# Patient Record
Sex: FEMALE | Race: WHITE
[De-identification: names, ages, dates, MRNs, and addresses within clinical notes are randomized per-mention and may not be internally consistent; named-entity substitution may affect disease eponyms.]

---

## 2018-02-22 ENCOUNTER — HOSPITAL ENCOUNTER (EMERGENCY)
Dept: HOSPITAL 91 - E/R | Age: 73
Discharge: HOME | End: 2018-02-22
Payer: MEDICARE

## 2018-02-22 ENCOUNTER — HOSPITAL ENCOUNTER (EMERGENCY)
Age: 73
Discharge: HOME | End: 2018-02-22

## 2018-02-22 DIAGNOSIS — D72.829: Primary | ICD-10-CM

## 2018-02-22 DIAGNOSIS — I10: ICD-10-CM

## 2018-02-22 DIAGNOSIS — R06.00: ICD-10-CM

## 2018-02-22 LAB
ADD MAN DIFF?: NO
ANION GAP: 14 (ref 8–16)
B-TYPE NATRIURETIC PEPTIDE: 228 PG/ML (ref 0–125)
BASOPHIL #: 0 10^3/UL (ref 0–0.1)
BASOPHILS %: 0.4 % (ref 0–2)
BLOOD UREA NITROGEN: 9 MG/DL (ref 7–20)
CALCIUM: 9.4 MG/DL (ref 8.4–10.2)
CARBON DIOXIDE: 27 MMOL/L (ref 21–31)
CHLORIDE: 101 MMOL/L (ref 97–110)
CREATININE: 0.6 MG/DL (ref 0.44–1)
EOSINOPHILS #: 0.1 10^3/UL (ref 0–0.5)
EOSINOPHILS %: 0.5 % (ref 0–7)
GLUCOSE: 109 MG/DL (ref 70–220)
HEMATOCRIT: 37.6 % (ref 37–47)
HEMOGLOBIN: 12.8 G/DL (ref 12–16)
LYMPHOCYTES #: 2.7 10^3/UL (ref 0.8–2.9)
LYMPHOCYTES %: 24 % (ref 15–51)
MEAN CORPUSCULAR HEMOGLOBIN: 30.4 PG (ref 29–33)
MEAN CORPUSCULAR HGB CONC: 34 G/DL (ref 32–37)
MEAN CORPUSCULAR VOLUME: 89.3 FL (ref 82–101)
MEAN PLATELET VOLUME: 10 FL (ref 7.4–10.4)
MONOCYTE #: 0.6 10^3/UL (ref 0.3–0.9)
MONOCYTES %: 4.8 % (ref 0–11)
NEUTROPHIL #: 8 10^3/UL (ref 1.6–7.5)
NEUTROPHILS %: 69.9 % (ref 39–77)
NUCLEATED RED BLOOD CELLS #: 0 10^3/UL (ref 0–0)
NUCLEATED RED BLOOD CELLS%: 0 /100WBC (ref 0–0)
PLATELET COUNT: 368 10^3/UL (ref 140–415)
POTASSIUM: 3.2 MMOL/L (ref 3.5–5.1)
RED BLOOD COUNT: 4.21 10^6/UL (ref 4.2–5.4)
RED CELL DISTRIBUTION WIDTH: 12.1 % (ref 11.5–14.5)
SODIUM: 139 MMOL/L (ref 135–144)
TROPONIN-I: 0.03 NG/ML (ref 0–0.12)
WHITE BLOOD COUNT: 11.4 10^3/UL (ref 4.8–10.8)

## 2018-02-22 PROCEDURE — 93005 ELECTROCARDIOGRAM TRACING: CPT

## 2018-02-22 PROCEDURE — 71045 X-RAY EXAM CHEST 1 VIEW: CPT

## 2018-02-22 PROCEDURE — 84484 ASSAY OF TROPONIN QUANT: CPT

## 2018-02-22 PROCEDURE — 85025 COMPLETE CBC W/AUTO DIFF WBC: CPT

## 2018-02-22 PROCEDURE — 96374 THER/PROPH/DIAG INJ IV PUSH: CPT

## 2018-02-22 PROCEDURE — 80048 BASIC METABOLIC PNL TOTAL CA: CPT

## 2018-02-22 PROCEDURE — 83880 ASSAY OF NATRIURETIC PEPTIDE: CPT

## 2018-02-22 PROCEDURE — 99285 EMERGENCY DEPT VISIT HI MDM: CPT

## 2018-02-22 RX ADMIN — ONDANSETRON HYDROCHLORIDE 1 MG: 2 INJECTION, SOLUTION INTRAMUSCULAR; INTRAVENOUS at 21:09

## 2018-02-22 RX ADMIN — LABETALOL HYDROCHLORIDE 1 MG: 5 INJECTION, SOLUTION INTRAVENOUS at 21:19

## 2018-02-22 RX ADMIN — ASPIRIN 325 MG ORAL TABLET 1 MG: 325 PILL ORAL at 21:09

## 2018-03-16 ENCOUNTER — HOSPITAL ENCOUNTER (EMERGENCY)
Dept: HOSPITAL 91 - E/R | Age: 73
Discharge: HOME | End: 2018-03-16
Payer: MEDICARE

## 2018-03-16 ENCOUNTER — HOSPITAL ENCOUNTER (EMERGENCY)
Age: 73
Discharge: HOME | End: 2018-03-16

## 2018-03-16 DIAGNOSIS — E66.9: ICD-10-CM

## 2018-03-16 DIAGNOSIS — Z79.84: ICD-10-CM

## 2018-03-16 DIAGNOSIS — E11.9: ICD-10-CM

## 2018-03-16 DIAGNOSIS — F43.0: Primary | ICD-10-CM

## 2018-03-16 DIAGNOSIS — I10: ICD-10-CM

## 2018-03-16 DIAGNOSIS — N39.0: ICD-10-CM

## 2018-03-16 LAB
ADD MAN DIFF?: NO
ADD UMIC: YES
ALANINE AMINOTRANSFERASE: 22 IU/L (ref 13–69)
ALBUMIN/GLOBULIN RATIO: 1.54
ALBUMIN: 5.1 G/DL (ref 3.3–4.9)
ALKALINE PHOSPHATASE: 86 IU/L (ref 42–121)
ANION GAP: 22 (ref 8–16)
ASPARTATE AMINO TRANSFERASE: 29 IU/L (ref 15–46)
BASOPHIL #: 0 10^3/UL (ref 0–0.1)
BASOPHILS %: 0.3 % (ref 0–2)
BILIRUBIN,DIRECT: 0 MG/DL (ref 0–0.2)
BILIRUBIN,TOTAL: 0.1 MG/DL (ref 0.2–1.3)
BLOOD UREA NITROGEN: 9 MG/DL (ref 7–20)
CALCIUM: 10.2 MG/DL (ref 8.4–10.2)
CARBON DIOXIDE: 22 MMOL/L (ref 21–31)
CHLORIDE: 102 MMOL/L (ref 97–110)
CREATININE: 0.82 MG/DL (ref 0.44–1)
EOSINOPHILS #: 0 10^3/UL (ref 0–0.5)
EOSINOPHILS %: 0.3 % (ref 0–7)
GLOBULIN: 3.3 G/DL (ref 1.3–3.2)
GLUCOSE: 115 MG/DL (ref 70–220)
HEMATOCRIT: 41.6 % (ref 37–47)
HEMOGLOBIN: 14.2 G/DL (ref 12–16)
LIPASE: 108 U/L (ref 23–300)
LYMPHOCYTES #: 2.3 10^3/UL (ref 0.8–2.9)
LYMPHOCYTES %: 21.7 % (ref 15–51)
MEAN CORPUSCULAR HEMOGLOBIN: 30 PG (ref 29–33)
MEAN CORPUSCULAR HGB CONC: 34.1 G/DL (ref 32–37)
MEAN CORPUSCULAR VOLUME: 87.8 FL (ref 82–101)
MEAN PLATELET VOLUME: 9.8 FL (ref 7.4–10.4)
MONOCYTE #: 0.4 10^3/UL (ref 0.3–0.9)
MONOCYTES %: 3.6 % (ref 0–11)
NEUTROPHIL #: 7.9 10^3/UL (ref 1.6–7.5)
NEUTROPHILS %: 73.7 % (ref 39–77)
NUCLEATED RED BLOOD CELLS #: 0 10^3/UL (ref 0–0)
NUCLEATED RED BLOOD CELLS%: 0 /100WBC (ref 0–0)
PLATELET COUNT: 357 10^3/UL (ref 140–415)
POTASSIUM: 3.7 MMOL/L (ref 3.5–5.1)
RED BLOOD COUNT: 4.74 10^6/UL (ref 4.2–5.4)
RED CELL DISTRIBUTION WIDTH: 12 % (ref 11.5–14.5)
SODIUM: 142 MMOL/L (ref 135–144)
TOTAL PROTEIN: 8.4 G/DL (ref 6.1–8.1)
TROPONIN-I: 0.01 NG/ML (ref 0–0.12)
UR ASCORBIC ACID: NEGATIVE MG/DL
UR BACTERIA: (no result) /HPF
UR BILIRUBIN (DIP): NEGATIVE MG/DL
UR BLOOD (DIP): (no result) MG/DL
UR CLARITY: (no result)
UR COLOR: YELLOW
UR GLUCOSE (DIP): NEGATIVE MG/DL
UR KETONES (DIP): NEGATIVE MG/DL
UR LEUKOCYTE ESTERASE (DIP): (no result) LEU/UL
UR MUCUS: (no result) /HPF
UR NITRITE (DIP): NEGATIVE MG/DL
UR PH (DIP): 5 (ref 5–9)
UR RBC: 4 /HPF (ref 0–5)
UR SPECIFIC GRAVITY (DIP): 1.02 (ref 1–1.03)
UR SQUAMOUS EPITHELIAL CELL: (no result) /HPF
UR TOTAL PROTEIN (DIP): (no result) MG/DL
UR UROBILINOGEN (DIP): NEGATIVE MG/DL
UR WBC: 3 /HPF (ref 0–5)
WHITE BLOOD COUNT: 10.8 10^3/UL (ref 4.8–10.8)

## 2018-03-16 PROCEDURE — 96374 THER/PROPH/DIAG INJ IV PUSH: CPT

## 2018-03-16 PROCEDURE — 85025 COMPLETE CBC W/AUTO DIFF WBC: CPT

## 2018-03-16 PROCEDURE — 99284 EMERGENCY DEPT VISIT MOD MDM: CPT

## 2018-03-16 PROCEDURE — 80053 COMPREHEN METABOLIC PANEL: CPT

## 2018-03-16 PROCEDURE — 83690 ASSAY OF LIPASE: CPT

## 2018-03-16 PROCEDURE — 81001 URINALYSIS AUTO W/SCOPE: CPT

## 2018-03-16 PROCEDURE — 93005 ELECTROCARDIOGRAM TRACING: CPT

## 2018-03-16 PROCEDURE — 36415 COLL VENOUS BLD VENIPUNCTURE: CPT

## 2018-03-16 PROCEDURE — 84484 ASSAY OF TROPONIN QUANT: CPT

## 2018-03-16 RX ADMIN — ONDANSETRON HYDROCHLORIDE 1 MG: 2 INJECTION, SOLUTION INTRAMUSCULAR; INTRAVENOUS at 13:45

## 2018-03-16 RX ADMIN — LIDOCAINE HYDROCHLORIDE 1 MLS/HR: 10 INJECTION, SOLUTION EPIDURAL; INFILTRATION; INTRACAUDAL; PERINEURAL at 13:48

## 2018-03-16 RX ADMIN — CEPHALEXIN 1 MG: 500 CAPSULE ORAL at 14:31

## 2018-03-16 RX ADMIN — LORAZEPAM 1 MG: 0.5 TABLET ORAL at 13:47

## 2018-08-08 ENCOUNTER — HOSPITAL ENCOUNTER (EMERGENCY)
Dept: HOSPITAL 91 - E/R | Age: 73
Discharge: HOME | End: 2018-08-08
Payer: MEDICARE

## 2018-08-08 ENCOUNTER — HOSPITAL ENCOUNTER (EMERGENCY)
Age: 73
Discharge: HOME | End: 2018-08-08

## 2018-08-08 DIAGNOSIS — I10: ICD-10-CM

## 2018-08-08 DIAGNOSIS — Z79.84: ICD-10-CM

## 2018-08-08 DIAGNOSIS — R40.2362: ICD-10-CM

## 2018-08-08 DIAGNOSIS — R40.2252: ICD-10-CM

## 2018-08-08 DIAGNOSIS — I16.0: Primary | ICD-10-CM

## 2018-08-08 DIAGNOSIS — R40.2142: ICD-10-CM

## 2018-08-08 LAB
ADD MAN DIFF?: NO
ADD UMIC: YES
ALANINE AMINOTRANSFERASE: 19 IU/L (ref 13–69)
ALBUMIN/GLOBULIN RATIO: 1.5
ALBUMIN: 4.5 G/DL (ref 3.3–4.9)
ALKALINE PHOSPHATASE: 50 IU/L (ref 42–121)
ANION GAP: 12 (ref 8–16)
ASPARTATE AMINO TRANSFERASE: 28 IU/L (ref 15–46)
BASOPHIL #: 0 10^3/UL (ref 0–0.1)
BASOPHILS %: 0.3 % (ref 0–2)
BILIRUBIN,DIRECT: 0 MG/DL (ref 0–0.2)
BILIRUBIN,TOTAL: 0.2 MG/DL (ref 0.2–1.3)
BLOOD UREA NITROGEN: 10 MG/DL (ref 7–20)
CALCIUM: 9.5 MG/DL (ref 8.4–10.2)
CARBON DIOXIDE: 24 MMOL/L (ref 21–31)
CHLORIDE: 109 MMOL/L (ref 97–110)
CREATININE: 0.56 MG/DL (ref 0.44–1)
EOSINOPHILS #: 0.1 10^3/UL (ref 0–0.5)
EOSINOPHILS %: 1.3 % (ref 0–7)
GLOBULIN: 3 G/DL (ref 1.3–3.2)
GLUCOSE: 98 MG/DL (ref 70–220)
HEMATOCRIT: 39.1 % (ref 37–47)
HEMOGLOBIN: 12.7 G/DL (ref 12–16)
LIPASE: 70 U/L (ref 23–300)
LYMPHOCYTES #: 3.2 10^3/UL (ref 0.8–2.9)
LYMPHOCYTES %: 34.3 % (ref 15–51)
MEAN CORPUSCULAR HEMOGLOBIN: 28.7 PG (ref 29–33)
MEAN CORPUSCULAR HGB CONC: 32.5 G/DL (ref 32–37)
MEAN CORPUSCULAR VOLUME: 88.5 FL (ref 82–101)
MEAN PLATELET VOLUME: 10.3 FL (ref 7.4–10.4)
MONOCYTE #: 0.4 10^3/UL (ref 0.3–0.9)
MONOCYTES %: 4.3 % (ref 0–11)
NEUTROPHIL #: 5.5 10^3/UL (ref 1.6–7.5)
NEUTROPHILS %: 59.6 % (ref 39–77)
NUCLEATED RED BLOOD CELLS #: 0 10^3/UL (ref 0–0)
NUCLEATED RED BLOOD CELLS%: 0 /100WBC (ref 0–0)
PLATELET COUNT: 302 10^3/UL (ref 140–415)
POTASSIUM: 3.9 MMOL/L (ref 3.5–5.1)
RED BLOOD COUNT: 4.42 10^6/UL (ref 4.2–5.4)
RED CELL DISTRIBUTION WIDTH: 13 % (ref 11.5–14.5)
SODIUM: 141 MMOL/L (ref 135–144)
TOTAL PROTEIN: 7.5 G/DL (ref 6.1–8.1)
UR ASCORBIC ACID: NEGATIVE MG/DL
UR BILIRUBIN (DIP): NEGATIVE MG/DL
UR BLOOD (DIP): NEGATIVE MG/DL
UR CLARITY: CLEAR
UR COLOR: (no result)
UR GLUCOSE (DIP): NEGATIVE MG/DL
UR KETONES (DIP): NEGATIVE MG/DL
UR LEUKOCYTE ESTERASE (DIP): (no result) LEU/UL
UR NITRITE (DIP): NEGATIVE MG/DL
UR PH (DIP): 7 (ref 5–9)
UR RBC: 0 /HPF (ref 0–5)
UR SPECIFIC GRAVITY (DIP): 1 (ref 1–1.03)
UR TOTAL PROTEIN (DIP): NEGATIVE MG/DL
UR UROBILINOGEN (DIP): NEGATIVE MG/DL
UR WBC: 2 /HPF (ref 0–5)
WHITE BLOOD COUNT: 9.2 10^3/UL (ref 4.8–10.8)

## 2018-08-08 PROCEDURE — 36415 COLL VENOUS BLD VENIPUNCTURE: CPT

## 2018-08-08 PROCEDURE — 80053 COMPREHEN METABOLIC PANEL: CPT

## 2018-08-08 PROCEDURE — 83690 ASSAY OF LIPASE: CPT

## 2018-08-08 PROCEDURE — 85025 COMPLETE CBC W/AUTO DIFF WBC: CPT

## 2018-08-08 PROCEDURE — 99285 EMERGENCY DEPT VISIT HI MDM: CPT

## 2018-08-08 PROCEDURE — 96375 TX/PRO/DX INJ NEW DRUG ADDON: CPT

## 2018-08-08 PROCEDURE — 96374 THER/PROPH/DIAG INJ IV PUSH: CPT

## 2018-08-08 PROCEDURE — 81001 URINALYSIS AUTO W/SCOPE: CPT

## 2018-08-08 PROCEDURE — 75635 CT ANGIO ABDOMINAL ARTERIES: CPT

## 2018-08-08 RX ADMIN — ONDANSETRON 1 MG: 4 TABLET, ORALLY DISINTEGRATING ORAL at 23:00

## 2018-08-08 RX ADMIN — LIDOCAINE HYDROCHLORIDE 1 MLS/HR: 10 INJECTION, SOLUTION EPIDURAL; INFILTRATION; INTRACAUDAL; PERINEURAL at 20:47

## 2018-08-08 RX ADMIN — HYDROCODONE BITARTRATE AND ACETAMINOPHEN 1 TAB: 10; 325 TABLET ORAL at 23:00

## 2018-08-08 RX ADMIN — ONDANSETRON HYDROCHLORIDE 1 MG: 2 INJECTION, SOLUTION INTRAMUSCULAR; INTRAVENOUS at 20:47

## 2018-08-08 RX ADMIN — NICARDIPINE HYDROCHLORIDE 1 MG: 30 CAPSULE ORAL at 22:54

## 2019-03-08 ENCOUNTER — HOSPITAL ENCOUNTER (EMERGENCY)
Dept: HOSPITAL 54 - ER | Age: 74
Discharge: HOME | End: 2019-03-08
Payer: MEDICARE

## 2019-03-08 VITALS — HEIGHT: 65 IN | BODY MASS INDEX: 28.66 KG/M2 | WEIGHT: 172 LBS

## 2019-03-08 VITALS — DIASTOLIC BLOOD PRESSURE: 86 MMHG | SYSTOLIC BLOOD PRESSURE: 141 MMHG

## 2019-03-08 DIAGNOSIS — R42: ICD-10-CM

## 2019-03-08 DIAGNOSIS — I10: ICD-10-CM

## 2019-03-08 DIAGNOSIS — E03.9: ICD-10-CM

## 2019-03-08 DIAGNOSIS — R11.2: Primary | ICD-10-CM

## 2019-03-08 DIAGNOSIS — R51: ICD-10-CM

## 2019-03-08 DIAGNOSIS — I48.91: ICD-10-CM

## 2019-03-08 DIAGNOSIS — K21.9: ICD-10-CM

## 2019-03-08 LAB
ALBUMIN SERPL BCP-MCNC: 3.6 G/DL (ref 3.4–5)
ALP SERPL-CCNC: 73 U/L (ref 46–116)
ALT SERPL W P-5'-P-CCNC: 18 U/L (ref 12–78)
AST SERPL W P-5'-P-CCNC: 16 U/L (ref 15–37)
BASOPHILS # BLD AUTO: 0 /CMM (ref 0–0.2)
BASOPHILS NFR BLD AUTO: 0.3 % (ref 0–2)
BILIRUB DIRECT SERPL-MCNC: 0.1 MG/DL (ref 0–0.2)
BILIRUB SERPL-MCNC: 0.2 MG/DL (ref 0.2–1)
BUN SERPL-MCNC: 14 MG/DL (ref 7–18)
CALCIUM SERPL-MCNC: 9.1 MG/DL (ref 8.5–10.1)
CHLORIDE SERPL-SCNC: 107 MMOL/L (ref 98–107)
CO2 SERPL-SCNC: 27 MMOL/L (ref 21–32)
CREAT SERPL-MCNC: 0.7 MG/DL (ref 0.6–1.3)
EOSINOPHIL NFR BLD AUTO: 1.2 % (ref 0–6)
GLUCOSE SERPL-MCNC: 114 MG/DL (ref 74–106)
HCT VFR BLD AUTO: 37 % (ref 33–45)
HGB BLD-MCNC: 12.1 G/DL (ref 11.5–14.8)
LIPASE SERPL-CCNC: 132 U/L (ref 73–393)
LYMPHOCYTES NFR BLD AUTO: 2.5 /CMM (ref 0.8–4.8)
LYMPHOCYTES NFR BLD AUTO: 29.4 % (ref 20–44)
MCHC RBC AUTO-ENTMCNC: 33 G/DL (ref 31–36)
MCV RBC AUTO: 90 FL (ref 82–100)
MONOCYTES NFR BLD AUTO: 0.4 /CMM (ref 0.1–1.3)
MONOCYTES NFR BLD AUTO: 4.5 % (ref 2–12)
NEUTROPHILS # BLD AUTO: 5.5 /CMM (ref 1.8–8.9)
NEUTROPHILS NFR BLD AUTO: 64.6 % (ref 43–81)
PLATELET # BLD AUTO: 319 /CMM (ref 150–450)
POTASSIUM SERPL-SCNC: 3.9 MMOL/L (ref 3.5–5.1)
PROT SERPL-MCNC: 6.9 G/DL (ref 6.4–8.2)
RBC # BLD AUTO: 4.08 MIL/UL (ref 4–5.2)
SODIUM SERPL-SCNC: 143 MMOL/L (ref 136–145)
WBC NRBC COR # BLD AUTO: 8.6 K/UL (ref 4.3–11)

## 2019-05-08 ENCOUNTER — HOSPITAL ENCOUNTER (OUTPATIENT)
Dept: HOSPITAL 10 - E/R | Age: 74
Setting detail: OBSERVATION
LOS: 2 days | Discharge: HOME | End: 2019-05-10
Attending: HOSPITALIST | Admitting: HOSPITALIST
Payer: MEDICARE

## 2019-05-08 ENCOUNTER — HOSPITAL ENCOUNTER (OUTPATIENT)
Dept: HOSPITAL 91 - E/R | Age: 74
Setting detail: OBSERVATION
LOS: 2 days | Discharge: HOME | End: 2019-05-10
Payer: MEDICARE

## 2019-05-08 VITALS — WEIGHT: 179.02 LBS | BODY MASS INDEX: 41.43 KG/M2 | HEIGHT: 55 IN

## 2019-05-08 VITALS — DIASTOLIC BLOOD PRESSURE: 95 MMHG | SYSTOLIC BLOOD PRESSURE: 177 MMHG | HEART RATE: 118 BPM

## 2019-05-08 VITALS — HEART RATE: 110 BPM | DIASTOLIC BLOOD PRESSURE: 75 MMHG | RESPIRATION RATE: 18 BRPM | SYSTOLIC BLOOD PRESSURE: 133 MMHG

## 2019-05-08 VITALS — HEART RATE: 82 BPM

## 2019-05-08 VITALS — DIASTOLIC BLOOD PRESSURE: 101 MMHG | HEART RATE: 76 BPM | SYSTOLIC BLOOD PRESSURE: 164 MMHG | RESPIRATION RATE: 18 BRPM

## 2019-05-08 DIAGNOSIS — I10: ICD-10-CM

## 2019-05-08 DIAGNOSIS — I48.91: ICD-10-CM

## 2019-05-08 DIAGNOSIS — Z79.82: ICD-10-CM

## 2019-05-08 DIAGNOSIS — K29.50: Primary | ICD-10-CM

## 2019-05-08 DIAGNOSIS — E03.9: ICD-10-CM

## 2019-05-08 DIAGNOSIS — K44.9: ICD-10-CM

## 2019-05-08 LAB
ADD MAN DIFF?: NO
ALANINE AMINOTRANSFERASE: 21 IU/L (ref 13–69)
ALBUMIN/GLOBULIN RATIO: 1.16
ALBUMIN: 4.2 G/DL (ref 3.3–4.9)
ALKALINE PHOSPHATASE: 86 IU/L (ref 42–121)
ANION GAP: 11 (ref 5–13)
ASPARTATE AMINO TRANSFERASE: 24 IU/L (ref 15–46)
BASOPHIL #: 0 10^3/UL (ref 0–0.1)
BASOPHILS %: 0.2 % (ref 0–2)
BILIRUBIN,DIRECT: 0 MG/DL (ref 0–0.2)
BILIRUBIN,TOTAL: 0.3 MG/DL (ref 0.2–1.3)
BLOOD UREA NITROGEN: 13 MG/DL (ref 7–20)
CALCIUM: 9.9 MG/DL (ref 8.4–10.2)
CARBON DIOXIDE: 22 MMOL/L (ref 21–31)
CHLORIDE: 110 MMOL/L (ref 97–110)
CREATININE: 0.71 MG/DL (ref 0.44–1)
EOSINOPHILS #: 0.1 10^3/UL (ref 0–0.5)
EOSINOPHILS %: 0.9 % (ref 0–7)
FREE T4 (FREE THYROXINE): 1.5 NG/DL (ref 0.78–2.44)
GLOBULIN: 3.6 G/DL (ref 1.3–3.2)
GLUCOSE: 116 MG/DL (ref 70–220)
HEMATOCRIT: 40.8 % (ref 37–47)
HEMOGLOBIN: 13.1 G/DL (ref 12–16)
IMMATURE GRANS #M: 0.03 10^3/UL (ref 0–0.03)
IMMATURE GRANS % (M): 0.4 % (ref 0–0.43)
INR: 0.85
LIPASE: 182 U/L (ref 23–300)
LYMPHOCYTES #: 2.2 10^3/UL (ref 0.8–2.9)
LYMPHOCYTES %: 26.9 % (ref 15–51)
MEAN CORPUSCULAR HEMOGLOBIN: 28.7 PG (ref 29–33)
MEAN CORPUSCULAR HGB CONC: 32.1 G/DL (ref 32–37)
MEAN CORPUSCULAR VOLUME: 89.5 FL (ref 82–101)
MEAN PLATELET VOLUME: 10.2 FL (ref 7.4–10.4)
MONOCYTE #: 0.4 10^3/UL (ref 0.3–0.9)
MONOCYTES %: 4.6 % (ref 0–11)
NEUTROPHIL #: 5.4 10^3/UL (ref 1.6–7.5)
NEUTROPHILS %: 67 % (ref 39–77)
NUCLEATED RED BLOOD CELLS #: 0 10^3/UL (ref 0–0)
NUCLEATED RED BLOOD CELLS%: 0 /100WBC (ref 0–0)
PARTIAL THROMBOPLASTIN TIME: 28.6 SEC (ref 23–35)
PLATELET COUNT: 355 10^3/UL (ref 140–415)
POTASSIUM: 5 MMOL/L (ref 3.5–5.1)
PROTIME: 11.7 SEC (ref 11.9–14.9)
PT RATIO: 0.9
RED BLOOD COUNT: 4.56 10^6/UL (ref 4.2–5.4)
RED CELL DISTRIBUTION WIDTH: 13.2 % (ref 11.5–14.5)
SODIUM: 143 MMOL/L (ref 135–144)
TOTAL PROTEIN: 7.8 G/DL (ref 6.1–8.1)
TROPONIN-I: < 0.012 NG/ML (ref 0–0.12)
WHITE BLOOD COUNT: 8 10^3/UL (ref 4.8–10.8)

## 2019-05-08 PROCEDURE — 43239 EGD BIOPSY SINGLE/MULTIPLE: CPT

## 2019-05-08 PROCEDURE — G0378 HOSPITAL OBSERVATION PER HR: HCPCS

## 2019-05-08 PROCEDURE — 85610 PROTHROMBIN TIME: CPT

## 2019-05-08 PROCEDURE — 92610 EVALUATE SWALLOWING FUNCTION: CPT

## 2019-05-08 PROCEDURE — 80053 COMPREHEN METABOLIC PANEL: CPT

## 2019-05-08 PROCEDURE — 83036 HEMOGLOBIN GLYCOSYLATED A1C: CPT

## 2019-05-08 PROCEDURE — 96376 TX/PRO/DX INJ SAME DRUG ADON: CPT

## 2019-05-08 PROCEDURE — C9113 INJ PANTOPRAZOLE SODIUM, VIA: HCPCS

## 2019-05-08 PROCEDURE — 85730 THROMBOPLASTIN TIME PARTIAL: CPT

## 2019-05-08 PROCEDURE — 99285 EMERGENCY DEPT VISIT HI MDM: CPT

## 2019-05-08 PROCEDURE — 96374 THER/PROPH/DIAG INJ IV PUSH: CPT

## 2019-05-08 PROCEDURE — 80061 LIPID PANEL: CPT

## 2019-05-08 PROCEDURE — 84100 ASSAY OF PHOSPHORUS: CPT

## 2019-05-08 PROCEDURE — 88312 SPECIAL STAINS GROUP 1: CPT

## 2019-05-08 PROCEDURE — 96375 TX/PRO/DX INJ NEW DRUG ADDON: CPT

## 2019-05-08 PROCEDURE — 97167 OT EVAL HIGH COMPLEX 60 MIN: CPT

## 2019-05-08 PROCEDURE — 74018 RADEX ABDOMEN 1 VIEW: CPT

## 2019-05-08 PROCEDURE — 84439 ASSAY OF FREE THYROXINE: CPT

## 2019-05-08 PROCEDURE — 93005 ELECTROCARDIOGRAM TRACING: CPT

## 2019-05-08 PROCEDURE — 71045 X-RAY EXAM CHEST 1 VIEW: CPT

## 2019-05-08 PROCEDURE — 84484 ASSAY OF TROPONIN QUANT: CPT

## 2019-05-08 PROCEDURE — 84443 ASSAY THYROID STIM HORMONE: CPT

## 2019-05-08 PROCEDURE — 88305 TISSUE EXAM BY PATHOLOGIST: CPT

## 2019-05-08 PROCEDURE — 85025 COMPLETE CBC W/AUTO DIFF WBC: CPT

## 2019-05-08 PROCEDURE — 97161 PT EVAL LOW COMPLEX 20 MIN: CPT

## 2019-05-08 PROCEDURE — 80048 BASIC METABOLIC PNL TOTAL CA: CPT

## 2019-05-08 PROCEDURE — 83690 ASSAY OF LIPASE: CPT

## 2019-05-08 PROCEDURE — 36415 COLL VENOUS BLD VENIPUNCTURE: CPT

## 2019-05-08 PROCEDURE — 83735 ASSAY OF MAGNESIUM: CPT

## 2019-05-08 RX ADMIN — ONDANSETRON HYDROCHLORIDE 1 MG: 2 INJECTION, SOLUTION INTRAMUSCULAR; INTRAVENOUS at 20:13

## 2019-05-08 RX ADMIN — PHENOBARBITAL, HYOSCYAMINE SULFATE, ATROPINE SULFATE, SCOPOLAMINE HYDROBROMIDE 1 TAB: 16.2; .1037; .0194; .0065 TABLET ORAL at 10:58

## 2019-05-08 RX ADMIN — ACETAMINOPHEN 1 MG: 325 TABLET, FILM COATED ORAL at 23:59

## 2019-05-08 RX ADMIN — CALCIUM GLUCONATE SCH MLS/HR: 94 INJECTION, SOLUTION INTRAVENOUS at 20:13

## 2019-05-08 RX ADMIN — ATORVASTATIN CALCIUM SCH MG: 80 TABLET, FILM COATED ORAL at 20:24

## 2019-05-08 RX ADMIN — FAMOTIDINE 1 MG: 20 TABLET ORAL at 10:58

## 2019-05-08 RX ADMIN — PANTOPRAZOLE SODIUM SCH MG: 40 INJECTION, POWDER, FOR SOLUTION INTRAVENOUS at 17:15

## 2019-05-08 RX ADMIN — THIAMINE HYDROCHLORIDE 1 MLS/HR: 100 INJECTION, SOLUTION INTRAMUSCULAR; INTRAVENOUS at 10:56

## 2019-05-08 RX ADMIN — POTASSIUM ACETATE 1 MLS/HR: 3.93 INJECTION, SOLUTION, CONCENTRATE INTRAVENOUS at 20:13

## 2019-05-08 RX ADMIN — CALCIUM GLUCONATE SCH MLS/HR: 94 INJECTION, SOLUTION INTRAVENOUS at 15:30

## 2019-05-08 RX ADMIN — POTASSIUM ACETATE 1 MLS/HR: 3.93 INJECTION, SOLUTION, CONCENTRATE INTRAVENOUS at 15:30

## 2019-05-08 RX ADMIN — PANTOPRAZOLE SODIUM 1 MG: 40 INJECTION, POWDER, FOR SOLUTION INTRAVENOUS at 17:15

## 2019-05-08 RX ADMIN — ALUMINUM HYDROXIDE, MAGNESIUM HYDROXIDE, DIMETHICONE 1 ML: 200; 200; 20 SUSPENSION ORAL at 10:58

## 2019-05-08 RX ADMIN — KETOROLAC TROMETHAMINE 1 MG: 15 INJECTION, SOLUTION INTRAMUSCULAR; INTRAVENOUS at 10:58

## 2019-05-08 RX ADMIN — ONDANSETRON HYDROCHLORIDE 1 MG: 2 INJECTION, SOLUTION INTRAMUSCULAR; INTRAVENOUS at 10:58

## 2019-05-08 RX ADMIN — TRIAMCINOLONE ACETONIDE SCH APPLIC: 1 CREAM TOPICAL at 21:00

## 2019-05-08 RX ADMIN — DEXAMETHASONE SODIUM PHOSPHATE PRN MG: 10 INJECTION, SOLUTION INTRAMUSCULAR; INTRAVENOUS at 20:13

## 2019-05-08 RX ADMIN — LORAZEPAM 1 MG: 2 INJECTION, SOLUTION INTRAMUSCULAR; INTRAVENOUS at 10:58

## 2019-05-08 RX ADMIN — CLOTRIMAZOLE AND BETAMETHASONE DIPROPIONATE SCH APPLIC: 10; .5 CREAM TOPICAL at 21:00

## 2019-05-08 RX ADMIN — METOPROLOL TARTRATE 1 MG: 100 TABLET ORAL at 20:24

## 2019-05-08 RX ADMIN — ONDANSETRON HYDROCHLORIDE 1 MG: 2 INJECTION, SOLUTION INTRAMUSCULAR; INTRAVENOUS at 13:08

## 2019-05-08 RX ADMIN — METOPROLOL TARTRATE SCH MG: 100 TABLET, FILM COATED ORAL at 20:24

## 2019-05-08 RX ADMIN — CLOTRIMAZOLE AND BETAMETHASONE DIPROPIONATE 1 APPLIC: 10; .5 CREAM TOPICAL at 21:00

## 2019-05-08 RX ADMIN — TRIAMCINOLONE ACETONIDE 1 APPLIC: 1 CREAM TOPICAL at 21:00

## 2019-05-08 RX ADMIN — ATORVASTATIN CALCIUM 1 MG: 80 TABLET, FILM COATED ORAL at 20:24

## 2019-05-08 RX ADMIN — PANTOPRAZOLE SODIUM 1 MG: 40 INJECTION, POWDER, FOR SOLUTION INTRAVENOUS at 10:59

## 2019-05-08 NOTE — CONS
Assessment/Plan


Assessment/Plan


Hospital Course (Demo Recall)


Assessment:


Epigastric pain


   -R/o gastritis vs PUD vs other


Nausea- without vomiting


Bloating/gas


HTN





Assessment:


PPI BID


Simethicone PRN 


Clear liquid diet


Npo after 5/9/19 0600


EGD tomorrow


   -Risk/benefits of sedation and procedure have been reviewed and patient 


agrees to move forward with procedure tomorrow


Patient seen in collaboration with Dr. Ny


CC:   GLORIA NY MD ;





Consultation Date/Type/Reason


Admit Date/Time





Date of Consultation:  May 8, 2019


Type of Consult


GI


Reason for Consultation


Epigastric pain, nausea


Date/Time of Note


DATE: 5/8/19 


TIME: 13:34





Hx of Present Illness


This is a 73-year-old [place] speaking female  was used, with past 


medical history of hypertension presented to the ED with complaints of 


significant epigastric pain x2 days nausea x2 months as well as belching and 


bloating.  She denies overt signs of GI bleed including melena, hematochezia, or


hematemesis.  Her last upper endoscopy was completed in 2010 showing some 


nodularity in the stomach as well as gastritis, colonoscopy was completed at 


that time positive for polyps.  Pathology was not available to review.  Time 


evaluation patient complained of significant epigastric pain with mild palpation


she is noted to have elevated heart rate and blood pressure at this time.  Her 


members at bedside who states patient does take Motrin or Advil however patient 


denies this.  Discussed plan for upper endoscopy reviewed risk/benefits of both 


procedure and sedation understanding was verbalized and patient agreed to move 


forward with procedure.


Review of Systems: 


A 12 system, review was conducted and is negative except as noted in the HPI or 


here.





Past Medical History


Home Meds


Active Scripts


Ondansetron (Ondansetron Odt) 4 Mg Tab.rapdis, 4 MG PO Q6H PRN for NAUSEA AND/OR


VOMITING, #10 TAB


   Prov:ANNA RAYMUNDO MD         8/8/18


Reported Medications


Acetaminophen* (Acetaminophen*) 500 MG Extra Strength Tablet, 500 MG PO Q4H PRN 


for PAIN AND OR ELEVATED TEMP, TAB


   5/8/19


Nitroglycerin* (Nitrostat*) 0.4 Mg Tab.subl, 0.4 MG SL Q5MIN PRN for CHEST PAIN,


BOTTLE


   5/8/19


Hydralazine Hcl* (Hydralazine Hcl*) 100 Mg Tablet, 1 TAB ORAL TID


   5/8/19


Diltiazem Hcl (DILTIAZEM 24HR CD) 180 Mg Cap.er.24h, 1 CAP ORAL DAILY


   5/8/19


Metoprolol Tartrate (Metoprolol Tartrate) 100 Mg Tablet, 1 TAB ORAL BID


   5/8/19


Aspirin (Aspirin) 81 Mg Chew, 1 TAB ORAL DAILY


   5/8/19


Triamcinolone Acetonide* (Kenalog*) 0.1%-15GM Cr, 1 APPLIC TOP TID, #1 TUB


   2/22/18


Donepezil* (Donepezil*) 10 Mg Tablet, 10 MG PO DAILY, #30 TAB


   2/22/18


Linaclotide (LINZESS) 145 Mcg Capsule, 145 MCG PO DAILY, #30 CAP


   2/22/18


Rosuvastatin Calcium* (Crestor*) 20 Mg Tablet, 20 MG PO QHS, #30 TAB


   2/22/18


Esomeprazole Mag Trihydrate (Nexium) 40 Mg Capsule.dr, 40 MG PO DAILY, #30 CAP


   2/22/18


Mirtazapine* (Mirtazapine*) 30 Mg Tablet, 30 MG PO DAILY, TAB


   2/22/18


Losartan-Hydrochlorothiazide (Losartan-HCTZ) 100-25 Mg Tab, 1 TAB PO DAILY, TAB


   2/22/18


Zolpidem Tartrate* (Zolpidem Tartrate*) 5 Mg Tablet, 5 MG PO QHS PRN for INSOM


YING, #30 TAB


   2/22/18


Amlodipine Besylate* (Norvasc*) 5 Mg Tablet, 5 MG PO DAILY, TAB


   2/22/18


Verapamil Hcl* (Verapamil ER*) 120 Mg Cap24h.pel, 120 MG PO DAILY, CAP


   2/22/18


Clotrimazole-Betamethasone Diprop (Clotrimazole-Betamethasone Diprop) 15 Gm 


Cream.gm., 1 APPLIC TOP BID, TUB


   2/22/18


Clonidine Patch (CLONIDINE PATCH) 0.3 Mg/24 Hr Patch, 1 PATCH.WK TD Q7D, #4 


PATCH.WK


   ON TUESDAY 2/22/18


Levothyroxine Sodium* (Levothyroxine Sodium*) 25 Mcg Tablet, 25 MCG PO BEFORE 


BREAKFAST, #30 TAB


   2/22/18


Meclizine Hcl* (Meclizine Hcl*) 25 Mg Tablet, 25 MG PO BID PRN for DIZZINESS, 


TAB


   2/22/18


Clonidine Hcl* (Clonidine Hcl*) 0.3 Mg Tablet, 0.3 MG PO TID PRN for HTN, TAB


   2/22/18


Discontinued Reported Medications


Tramadol Hcl* (Ultram*) 50 Mg Tablet, 50 MG PO BID PRN for PAIN, TAB


   2/22/18


Metformin Hcl* (Metformin Hcl*) 500 Mg Tablet, 500 MG PO WITH BREAKFAST DINNE, 


#60 TAB


   2/22/18


Naproxen* (Naproxen*) 500 Mg Tablet, 500 MG PO DAILY, TAB


   2/22/18


Hydrochlorothiazide* (Hydrochlorothiazide*) 25 Mg Tab, 25 MG PO DAILY, #30 TAB


   2/22/18


Acetaminophen with Codeine (Acetaminophen-Cod #3 Tablet) 1 Each Tablet, 1 TAB PO


DAILY PRN for AS NEEDED, #20 TAB


   2/22/18


Discontinued Scripts


Hydrocodone/Acetaminophen (Norco  Tablet) 1 Each Tablet, 1 TAB PO Q6H PRN 


for PAIN, #7 TAB


   Prov:ANNA RAYMUNDO MD         8/8/18


Lorazepam* (Ativan*) 0.5 Mg Tablet, 0.5 MG PO TID PRN for ANXIETY, #12 TAB


   Prov:KAYLA STATON MD         3/16/18


Cephalexin* (Keflex*) 500 Mg Capsule, 500 MG PO QID for 5 Days, CAP


   Prov:KAYLA STATON MD         3/16/18


Allergies:  


Coded Allergies:  


     No Known Allergies (Verified  Allergy, Unknown, 5/8/19)





Social History


Smoking Status:  Never smoker





Exam/Review of Systems


Exam


Vitals





Vital Signs


  Date      Temp  Pulse  Resp  B/P (MAP)   Pulse Ox  O2          O2 Flow    FiO2


Time                                                 Delivery    Rate


    5/8/19           85    18      122/72       100  Room Air


     12:14                           (89)


    5/8/19  98.4


     10:15





Exam


PHYSICAL EXAMINATION:


GENERAL: Alert & oriented x 3, in no acute distress


SKIN: No lesions


HEAD: Normocephalic, atraumatic, no tenderness.


EYES: Pupils equal reactive to light and accommodation, no discharge.


EARS/NOSE AND THROAT: Ears normal, nose normal.


NECK: Supple, no masses, thyroid normal.


CHEST: Inspection within normal limits.


CARDIOVASCULAR: Heart: Regular rate and rhythm


RESPIRATORY: Lungs clear to auscultation.


GASTROINTESTINAL AND LIVER: Abdomen: Soft, epigastric pain worse with palpation,


non-distended, no hernias, no masses, no organomegaly, no ascites, no guarding, 


no rebound tenderness, normoactive bowel sounds. Rectal: Deferred.





Results


Result Diagram:  


5/8/19 1108                                                                     


          5/8/19 1108





Results 24hrs





Laboratory Tests


               Test
                                5/8/19
11:08


               White Blood Count                           8.0


               Red Blood Count                            4.56


               Hemoglobin                                 13.1


               Hematocrit                                 40.8


               Mean Corpuscular Volume                    89.5


               Mean Corpuscular Hemoglobin               28.7  L


               Mean Corpuscular Hemoglobin
Concent       32.1  



               Red Cell Distribution Width                13.2


               Platelet Count                              355


               Mean Platelet Volume                       10.2


               Immature Granulocytes %                   0.400


               Neutrophils %                              67.0


               Lymphocytes %                              26.9


               Monocytes %                                 4.6


               Eosinophils %                               0.9


               Basophils %                                 0.2


               Nucleated Red Blood Cells %                 0.0


               Immature Granulocytes #                   0.030


               Neutrophils #                               5.4


               Lymphocytes #                               2.2


               Monocytes #                                 0.4


               Eosinophils #                               0.1


               Basophils #                                 0.0


               Nucleated Red Blood Cells #                 0.0


               Prothrombin Time                          11.7  L


               Prothrombin Time Ratio                      0.9


               INR International Normalized
Ratio        0.85  



               Activated Partial
Thromboplast Time       28.6  



               Sodium Level                                143


               Potassium Level                             5.0


               Chloride Level                              110


               Carbon Dioxide Level                         22


               Anion Gap                                    11


               Blood Urea Nitrogen                          13


               Creatinine                                 0.71


               Est Glomerular Filtrat Rate
mL/min     



               Glucose Level                               116


               Calcium Level                               9.9


               Total Bilirubin                             0.3


               Direct Bilirubin                           0.00


               Indirect Bilirubin                          0.3


               Aspartate Amino Transf
(AST/SGOT)           24  



               Alanine Aminotransferase
(ALT/SGPT)         21  



               Alkaline Phosphatase                         86


               Troponin I                           < 0.012


               Total Protein                               7.8


               Albumin                                     4.2


               Globulin                                  3.60  H


               Albumin/Globulin Ratio                     1.16


               Lipase                                      182














DENI CAIN              May 8, 2019 13:46

## 2019-05-08 NOTE — ERD
ER Documentation


Chief Complaint


Chief Complaint





AP WITH NAUSEA TODAY





HPI


73-year-old woman complaining of sharp nonexertional nonradiating epigastric 


abdominal pain, burning throat pain, belching, bloating, upper abdominal 


distention x2 days.  She has had severe nausea has been eating less due to 


nausea but denies vomiting.  She denies blood per rectum or melena, no chest 


pain or shortness of breath, no headache or blurry vision.  Patient does have a 


history of gastritis but ran out of her gastritis medications a few months ago, 


upper endoscopy performed about 10 years ago that she states revealed gastritis.





ROS


All systems reviewed and are negative except as per history of present illness.





Medications


Home Meds


Active Scripts


Ondansetron (Ondansetron Odt) 4 Mg Tab.rapdis, 4 MG PO Q6H PRN for NAUSEA AND/OR


VOMITING, #10 TAB


   Prov:ANNA RAYMUNDO MD         8/8/18


Reported Medications


Acetaminophen* (Acetaminophen*) 500 MG Extra Strength Tablet, 500 MG PO Q4H PRN 


for PAIN AND OR ELEVATED TEMP, TAB


   5/8/19


Nitroglycerin* (Nitrostat*) 0.4 Mg Tab.subl, 0.4 MG SL Q5MIN PRN for CHEST PAIN,


BOTTLE


   5/8/19


Hydralazine Hcl* (Hydralazine Hcl*) 100 Mg Tablet, 1 TAB ORAL TID


   5/8/19


Diltiazem Hcl (DILTIAZEM 24HR CD) 180 Mg Cap.er.24h, 1 CAP ORAL DAILY


   5/8/19


Metoprolol Tartrate (Metoprolol Tartrate) 100 Mg Tablet, 1 TAB ORAL BID


   5/8/19


Aspirin (Aspirin) 81 Mg Chew, 1 TAB ORAL DAILY


   5/8/19


Triamcinolone Acetonide* (Kenalog*) 0.1%-15GM Cr, 1 APPLIC TOP TID, #1 TUB


   2/22/18


Donepezil* (Donepezil*) 10 Mg Tablet, 10 MG PO DAILY, #30 TAB


   2/22/18


Linaclotide (LINZESS) 145 Mcg Capsule, 145 MCG PO DAILY, #30 CAP


   2/22/18


Rosuvastatin Calcium* (Crestor*) 20 Mg Tablet, 20 MG PO QHS, #30 TAB


   2/22/18


Esomeprazole Mag Trihydrate (Nexium) 40 Mg Capsule.dr, 40 MG PO DAILY, #30 CAP


   2/22/18


Mirtazapine* (Mirtazapine*) 30 Mg Tablet, 30 MG PO DAILY, TAB


   2/22/18


Losartan-Hydrochlorothiazide (Losartan-HCTZ) 100-25 Mg Tab, 1 TAB PO DAILY, TAB


   2/22/18


Zolpidem Tartrate* (Zolpidem Tartrate*) 5 Mg Tablet, 5 MG PO QHS PRN for 


INSOMNIA, #30 TAB


   2/22/18


Amlodipine Besylate* (Norvasc*) 5 Mg Tablet, 5 MG PO DAILY, TAB


   2/22/18


Verapamil Hcl* (Verapamil ER*) 120 Mg Cap24h.pel, 120 MG PO DAILY, CAP


   2/22/18


Clotrimazole-Betamethasone Diprop (Clotrimazole-Betamethasone Diprop) 15 Gm 


Cream.gm., 1 APPLIC TOP BID, TUB


   2/22/18


Clonidine Patch (CLONIDINE PATCH) 0.3 Mg/24 Hr Patch, 1 PATCH.WK TD Q7D, #4 


PATCH.WK


   ON TUESDAY 2/22/18


Levothyroxine Sodium* (Levothyroxine Sodium*) 25 Mcg Tablet, 25 MCG PO BEFORE 


BREAKFAST, #30 TAB


   2/22/18


Meclizine Hcl* (Meclizine Hcl*) 25 Mg Tablet, 25 MG PO BID PRN for DIZZINESS, 


TAB


   2/22/18


Clonidine Hcl* (Clonidine Hcl*) 0.3 Mg Tablet, 0.3 MG PO TID PRN for HTN, TAB


   2/22/18


Discontinued Reported Medications


Tramadol Hcl* (Ultram*) 50 Mg Tablet, 50 MG PO BID PRN for PAIN, TAB


   2/22/18


Metformin Hcl* (Metformin Hcl*) 500 Mg Tablet, 500 MG PO WITH BREAKFAST DINNE, 


#60 TAB


   2/22/18


Naproxen* (Naproxen*) 500 Mg Tablet, 500 MG PO DAILY, TAB


   2/22/18


Hydrochlorothiazide* (Hydrochlorothiazide*) 25 Mg Tab, 25 MG PO DAILY, #30 TAB


   2/22/18


Acetaminophen with Codeine (Acetaminophen-Cod #3 Tablet) 1 Each Tablet, 1 TAB PO


DAILY PRN for AS NEEDED, #20 TAB


   2/22/18


Discontinued Scripts


Hydrocodone/Acetaminophen (Norco  Tablet) 1 Each Tablet, 1 TAB PO Q6H PRN 


for PAIN, #7 TAB


   Prov:ANNA RAYMUNDO MD         8/8/18


Lorazepam* (Ativan*) 0.5 Mg Tablet, 0.5 MG PO TID PRN for ANXIETY, #12 TAB


   Prov:KAYLA STATON MD         3/16/18


Cephalexin* (Keflex*) 500 Mg Capsule, 500 MG PO QID for 5 Days, CAP


   Prov:KAYLA STATON MD         3/16/18





Allergies


Allergies:  


Coded Allergies:  


     No Known Allergies (Verified  Allergy, Unknown, 5/8/19)





PMhx/Soc


History of gastritis status post upper endoscopy about 10 years ago, 


hypertension, anxiety, hypothyroidism, atrial fibrillation


History of Surgery:  No


Anesthesia Reaction:  No


Hx Neurological Disorder:  No


Hx Respiratory Disorders:  No


Hx Cardiac Disorders:  Yes (HTN)


Hx Psychiatric Problems:  No


Hx Miscellaneous Medical Probl:  Yes (HTN)


Hx Alcohol Use:  No


Hx Substance Use:  No


Hx Tobacco Use:  No


Smoking Status:  Never smoker





FmHx


Family History:  No diabetes





Physical Exam


Vitals





Vital Signs


  Date      Temp  Pulse  Resp  B/P (MAP)   Pulse Ox  O2          O2 Flow    FiO2


Time                                                 Delivery    Rate


    5/8/19           85    18      122/72       100  Room Air


     12:14                           (89)


    5/8/19  98.4     86    18     177/105        98


     10:15                          (129)





Physical Exam


GENERAL: Well-developed, well-nourished, appears dehydrated, afebrile, anxious


HEENT: Dry mucous membranes, pink conjunctiva, no cervical spine tenderness or 


step-off deformities, no goiter, no jaundice or icterus, extraocular movements 


intact without pain. No submandibular induration, and no pharyngeal erythema


NEURO: Alert and oriented 3, cranial nerves II through XII intact bilaterally, 


pupils equal round reactive to light, no focal deficits or facial asymmetry, 


sensation intact distally Strength 5/5 in upper and lower extremities 


bilaterally


CARDIAC: Regular rate and rhythm, no murmurs rubs or gallops


LUNGS: Clear bilaterally no wheezing crackles or stridor


ABDOMEN: Soft nontender, no guarding, no rigidity, no rebound, no psoas sign no 


obturator sign. 


SKIN: Warm and dry to touch, no abrasions, contusions, or hematomas, no 


lacerations, no ecchymosis, no target lesions, and without ulcers


EXTREMITIES: No clubbing cyanosis or edema, calves are bilaterally symmetrical, 


no Homans sign, no popliteal cord sign. Distal pulses equal and bilateral


PSYCH: Acutely anxious


Result Diagram:  


5/8/19 1108                                                                     


          5/8/19 1108





Results 24 hrs





Laboratory Tests


              Test
                                  5/8/19
11:08


              White Blood Count                      8.0 10^3/ul


              Red Blood Count                       4.56 10^6/ul


              Hemoglobin                               13.1 g/dl


              Hematocrit                                  40.8 %


              Mean Corpuscular Volume                    89.5 fl


              Mean Corpuscular Hemoglobin                28.7 pg


              Mean Corpuscular Hemoglobin
Concent     32.1 g/dl 



              Red Cell Distribution Width                 13.2 %


              Platelet Count                         355 10^3/UL


              Mean Platelet Volume                       10.2 fl


              Immature Granulocytes %                    0.400 %


              Neutrophils %                               67.0 %


              Lymphocytes %                               26.9 %


              Monocytes %                                  4.6 %


              Eosinophils %                                0.9 %


              Basophils %                                  0.2 %


              Nucleated Red Blood Cells %            0.0 /100WBC


              Immature Granulocytes #              0.030 10^3/ul


              Neutrophils #                          5.4 10^3/ul


              Lymphocytes #                          2.2 10^3/ul


              Monocytes #                            0.4 10^3/ul


              Eosinophils #                          0.1 10^3/ul


              Basophils #                            0.0 10^3/ul


              Nucleated Red Blood Cells #            0.0 10^3/ul


              Prothrombin Time                          11.7 Sec


              Prothrombin Time Ratio                         0.9


              INR International Normalized
Ratio           0.85 



              Activated Partial
Thromboplast Time      28.6 Sec 



              Sodium Level                            143 mmol/L


              Potassium Level                         5.0 mmol/L


              Chloride Level                          110 mmol/L


              Carbon Dioxide Level                     22 mmol/L


              Anion Gap                                       11


              Blood Urea Nitrogen                       13 mg/dl


              Creatinine                              0.71 mg/dl


              Est Glomerular Filtrat Rate
mL/min    mL/min 



              Glucose Level                            116 mg/dl


              Calcium Level                            9.9 mg/dl


              Total Bilirubin                          0.3 mg/dl


              Direct Bilirubin                        0.00 mg/dl


              Indirect Bilirubin                       0.3 mg/dl


              Aspartate Amino Transf
(AST/SGOT)         24 IU/L 



              Alanine Aminotransferase
(ALT/SGPT)       21 IU/L 



              Alkaline Phosphatase                       86 IU/L


              Troponin I                           < 0.012 ng/ml


              Total Protein                             7.8 g/dl


              Albumin                                   4.2 g/dl


              Globulin                                 3.60 g/dl


              Albumin/Globulin Ratio                        1.16


              Lipase                                     182 U/L





Current Medications


 Medications
   Dose
          Sig/Britni
       Start Time
   Status  Last


 (Trade)       Ordered        Route
 PRN     Stop Time              Admin
Dose


                              Reason                                Admin


 Lorazepam
     0.5 mg         ONCE  ONCE
    5/8/19        DC            5/8/19


(Ativan)                      IV
            11:00
 5/8/19                10:58



                                             11:01


 Sodium         1,000 ml @ 
   Q1H STAT
      5/8/19        DC            5/8/19


Chloride       1,000 mls/hr   IV
            10:44
 5/8/19                10:56



                                             11:43


                40 mg          ONCE  STAT
    5/8/19        DC            5/8/19


Pantoprazole
                 IV
            10:44
 5/8/19                10:59



 (Protonix                                   10:46


Iv)


 Ondansetron    4 mg           ONCE  STAT
    5/8/19        DC            5/8/19


HCl
  (Zofran                 IV
            10:44
 5/8/19                10:58



Inj)                                         10:46


 Famotidine
    20 mg          ONCE  STAT
    5/8/19        DC            5/8/19


(Pepcid)                      PO
            10:46
 5/8/19                10:58



                                             10:47


                40 ml          ONCE  STAT
    5/8/19        DC            5/8/19


Miscellaneous                 PO
            10:46
 5/8/19                10:58




 Medication
                                10:47


 (Gi Cocktail


(2))


 Belladonna/
   2 tab          ONCE  STAT
    5/8/19        DC            5/8/19


Phenobarbital                 PO
            10:46
 5/8/19                10:58




  (Donnatal)                                10:47


 Ketorolac
     15 mg          ONCE  STAT
    5/8/19        DC            5/8/19


Tromethamine
                 IV
            10:46
 5/8/19                10:58



 (Toradol)                                   10:47


 Morphine       4 mg           ONCE  STAT
    5/8/19        DC       



Sulfate
                      IV
            12:59
 5/8/19


(morphine)                                   13:00


 Ondansetron    4 mg           ONCE  STAT
    5/8/19        DC       



HCl
  (Zofran                 IV
            12:59
 5/8/19


Inj)                                         13:00








[facility]


IV line was established patient was placed on cardiac monitor rhythm strip 


revealed a sinus rhythm at about 80 bpm with upright P and T waves.  Patient was


afebrile





EKG performed, read by me revealed an atrial fibrillation rate controlled at 85 


bpm, left axis deviation, narrow QRS complex, no concerning ST elevations or 


depressions noted





1 view chest x-ray performed, read by me revealed linear atelectasis in the 


right lung, no air under the diaphragm, no pneumothorax, no acute infiltrates





I administered 500 cc normal saline IV, lorazepam 0.5 mg IV, Toradol 15 mg IV, 


Zofran 4 mg IV, Protonix 40 mg IV, famotidine 20 mg p.o., GI cocktail p.o.





For continued abdominal pain administered morphine 4 mg IV and another dose of 


Zofran 4 mg IV





CBC and electrolytes were normal, liver function tests normal, troponin 


negative, urinalysis is pending I will follow-up.





I paged Dr. Anant COTTO regarding the patient's presentation and symptomatology.





Patient will be admitted to [facility] for continued medical management, IV 


hydration, pain control, possible upper endoscopy pending GI consultation





Departure


Diagnosis:  


   Primary Impression:  


   Abdominal pain


   Abdominal location:  epigastric  Qualified Codes:  R10.13 - Epigastric pain


   Additional Impressions:  


   Acute anxiety


   Intractable abdominal pain


   Intractable nausea and vomiting


   Vomiting type:  unspecified  Qualified Codes:  R11.2 - Nausea with vomiting, 


   unspecified


   Gastritis


   Gastritis type:  unspecified gastritis  Chronicity:  acute  Gastritis 


   bleeding:  without bleeding  Qualified Codes:  K29.00 - Acute gastritis 


   without bleeding


Condition:  Stable











KAYLA STATON MD              May 8, 2019 11:14

## 2019-05-08 NOTE — HP
DATE OF ADMISSION: 05/08/2019

 

IDENTIFICATION:  This is a 73-year-old female.

 

CHIEF COMPLAINT:  Epigastric pain.

 

HISTORY OF PRESENT ILLNESS:  A 73-year-old female with past medical history of hypertension, hypothyr
oidism, possible anxiety, questionable diabetes, who comes in with epigastric pain.  Most of the info
rmation is obtained from ER documentation as the patient is unable to provide full HPI secondary to a
 language barrier at this time.  Some subjective fevers.  Her symptoms have been going on for the las
t 2 days.  She had some nausea symptoms, but not vomiting.  In the ER, to the staff she denied chest 
pain or shortness of breath, no upper or lower GI bleeding.   That is the full extent of the review o
f systems that could be obtained at this time.  Apparently the patient has a prior history of gastrit
is as well, ran out of her medications for this a few months ago.  When she arrived, she was seen by 
GI team and in the ER today, she had elevated blood pressure 177/105 and also for epigastric pain she
 received pain control medications and GI cocktail as well as Pepcid and Protonix.

 

PAST MEDICAL HISTORY:  As stated above.

 

ALLERGIES:  No known drug allergies.

 

MEDICATIONS AT HOME:  Based on records:

1.  Donepezil 10 mg daily.

2.  Norvasc 5 mg daily.

3.  Clonidine 0.3 mg p.o. t.i.d. p.r.n.

4.  Clonidine patch 0.3 mg q.24h q. weekly.

5.  Diltiazem 180 mg daily.

6.  Hydralazine 100 mg t.i.d. 

7.  Losartan/hydrochlorothiazide 100/25 one tab daily.

8.  Metoprolol 100 mg b.i.d.

9.  Crestor 20 mg at bedtime.

10.  Nitroglycerin sublingual p.r.n. 

11.  Verapamil 120 mg daily.  

12.  Extra strength Tylenol 500 mg. p.r.n.

13.  Aspirin 81 mg daily.

14.  Mirtazapine 30 mg daily.

15.  Ambien 5 mg p.o. at bedtime p.r.n.

16.  Nexium 40 mg daily.

17.  Linzess 145 mcg daily.

18.  Meclizine 25 mg p.o. b.i.d. p.r.n.

19.  Zofran 4 mg p.o. q.6h. p.r.n.

20.  Levothyroxine 25 mcg every morning.

21.  Kenalog apply topically t.i.d.

 

PAST SURGICAL HISTORY:  Unknown.

 

SOCIAL HISTORY:  Negative for smoking or drinking or IV drug abuse.

 

FAMILY HISTORY:  Noncontributory.

 

PHYSICAL EXAMINATION:

VITAL SIGNS:  T-max 98.4, pulse 85, respirations 18, blood pressure to 177-122 systolic over 105-72 d
iastolic, satting at 98% on room air.

GENERAL:  The patient is lying in bed, answering questions appropriately, in no acute distress.

HEENT:  Pupils equal, round, reactive to light.  Extraocular muscles intact.

NECK:  Supple, no thyromegaly.

LUNGS:  Clear to auscultation bilaterally.

CARDIOVASCULAR:  S1, S2 heard.  No rubs or gallops.

ABDOMEN:  Tender to palpation in epigastric area, but otherwise no rebound or guarding.  Normal bowel
 sounds, nondistended.  

MUSCULOSKELETAL:  No lower extremity edema bilaterally.

NEUROLOGIC:  No focal deficits.

 

LABORATORIES:  CBC is normal.  The basic metabolic panel was normal as well.  Comprehensive metabolic
 panel is normal.  Troponin is negative x1.  Lipase is normal.  The patient had a chest x-ray today s
hows mild cardiomegaly, calcified aorta consistent with atherosclerotic disease, right lower lobe karmen
ear atelectatic changes.  CT scan of abdomen and pelvis was ordered, results are still pending.

 

ASSESSMENT AND PLAN:  A 73-year-old female with prior history of hypertension, gastritis, and hypothy
roidism who presents with epigastric pain for the last 2 days, epigastric pain, likely secondary to g
astritis.  Denies any upper or lower GI bleeding.  

1.  Admit the patient, put on low-dose IV fluids.  Appreciate GI consult.  They are planning for EGD 
in the next 24 hours, should be on Simethicone p.r.n and on clear liquid diet. 

2.  Get PT, OT and speech therapy consult as well.  

3.  Check TSH, A1c, lipid panel.  

4.  Hypertension.  The patient takes multiple blood pressure medicines at home.  She did come in with
 mild borderline hypertensive urgency, which is improved now.  Continue current p.o. blood pressure m
edicines.  Should be on hydralazine and clonidine p.r.n. as well, systolic greater than 160.

5.  History of hypothyroidism.  Continue levothyroxine.  Follow up thyroid panel.

6.  Gastrointestinal prophylaxis.  PPI.

7.  TB prophylaxis, SCDs.

 

 

Dictated By: MAXIMINO VALVERDE

DD:    05/08/2019 15:21:26

DT:    05/08/2019 15:51:07

Conf#: [id number]

DID#:  [id number]

## 2019-05-09 VITALS — RESPIRATION RATE: 18 BRPM | DIASTOLIC BLOOD PRESSURE: 86 MMHG | HEART RATE: 89 BPM | SYSTOLIC BLOOD PRESSURE: 191 MMHG

## 2019-05-09 VITALS — HEART RATE: 22 BPM | RESPIRATION RATE: 22 BRPM | DIASTOLIC BLOOD PRESSURE: 81 MMHG | SYSTOLIC BLOOD PRESSURE: 158 MMHG

## 2019-05-09 VITALS — SYSTOLIC BLOOD PRESSURE: 177 MMHG | DIASTOLIC BLOOD PRESSURE: 99 MMHG | RESPIRATION RATE: 19 BRPM | HEART RATE: 72 BPM

## 2019-05-09 VITALS — RESPIRATION RATE: 19 BRPM | SYSTOLIC BLOOD PRESSURE: 136 MMHG | DIASTOLIC BLOOD PRESSURE: 85 MMHG | HEART RATE: 97 BPM

## 2019-05-09 VITALS — HEART RATE: 120 BPM

## 2019-05-09 VITALS — SYSTOLIC BLOOD PRESSURE: 147 MMHG | DIASTOLIC BLOOD PRESSURE: 79 MMHG | HEART RATE: 61 BPM | RESPIRATION RATE: 19 BRPM

## 2019-05-09 VITALS — RESPIRATION RATE: 18 BRPM | SYSTOLIC BLOOD PRESSURE: 146 MMHG | HEART RATE: 88 BPM | DIASTOLIC BLOOD PRESSURE: 86 MMHG

## 2019-05-09 VITALS — SYSTOLIC BLOOD PRESSURE: 119 MMHG | DIASTOLIC BLOOD PRESSURE: 75 MMHG | HEART RATE: 88 BPM | RESPIRATION RATE: 15 BRPM

## 2019-05-09 VITALS — HEART RATE: 101 BPM | SYSTOLIC BLOOD PRESSURE: 145 MMHG | RESPIRATION RATE: 16 BRPM | DIASTOLIC BLOOD PRESSURE: 80 MMHG

## 2019-05-09 VITALS — HEART RATE: 76 BPM | RESPIRATION RATE: 16 BRPM | SYSTOLIC BLOOD PRESSURE: 92 MMHG | DIASTOLIC BLOOD PRESSURE: 61 MMHG

## 2019-05-09 VITALS — RESPIRATION RATE: 15 BRPM | DIASTOLIC BLOOD PRESSURE: 80 MMHG | HEART RATE: 97 BPM | SYSTOLIC BLOOD PRESSURE: 140 MMHG

## 2019-05-09 VITALS — RESPIRATION RATE: 16 BRPM | DIASTOLIC BLOOD PRESSURE: 60 MMHG | SYSTOLIC BLOOD PRESSURE: 124 MMHG | HEART RATE: 95 BPM

## 2019-05-09 VITALS — HEART RATE: 98 BPM | DIASTOLIC BLOOD PRESSURE: 83 MMHG | RESPIRATION RATE: 21 BRPM | SYSTOLIC BLOOD PRESSURE: 146 MMHG

## 2019-05-09 VITALS — RESPIRATION RATE: 18 BRPM | SYSTOLIC BLOOD PRESSURE: 141 MMHG | DIASTOLIC BLOOD PRESSURE: 90 MMHG | HEART RATE: 96 BPM

## 2019-05-09 VITALS — HEART RATE: 102 BPM | RESPIRATION RATE: 15 BRPM | DIASTOLIC BLOOD PRESSURE: 86 MMHG | SYSTOLIC BLOOD PRESSURE: 130 MMHG

## 2019-05-09 VITALS — HEART RATE: 89 BPM

## 2019-05-09 VITALS — HEART RATE: 109 BPM

## 2019-05-09 VITALS — HEART RATE: 95 BPM

## 2019-05-09 VITALS — HEART RATE: 93 BPM

## 2019-05-09 LAB
ADD MAN DIFF?: NO
ANION GAP: 10 (ref 5–13)
BASOPHIL #: 0 10^3/UL (ref 0–0.1)
BASOPHILS %: 0.4 % (ref 0–2)
BLOOD UREA NITROGEN: 10 MG/DL (ref 7–20)
CALCIUM: 9.4 MG/DL (ref 8.4–10.2)
CARBON DIOXIDE: 21 MMOL/L (ref 21–31)
CHLORIDE: 111 MMOL/L (ref 97–110)
CHOL/HDL RATIO: 5 RATIO
CHOLESTEROL: 268 MG/DL (ref 100–200)
CREATININE: 0.65 MG/DL (ref 0.44–1)
EOSINOPHILS #: 0.1 10^3/UL (ref 0–0.5)
EOSINOPHILS %: 0.9 % (ref 0–7)
GLUCOSE: 107 MG/DL (ref 70–220)
HDL CHOLESTEROL: 53 MG/DL (ref 33–92)
HEMATOCRIT: 39.7 % (ref 37–47)
HEMOGLOBIN A1C: 5.7 % (ref 0–5.9)
HEMOGLOBIN: 12.8 G/DL (ref 12–16)
IMMATURE GRANS #M: 0.04 10^3/UL (ref 0–0.03)
IMMATURE GRANS % (M): 0.5 % (ref 0–0.43)
LDL CHOLESTEROL,CALCULATED: 186 MG/DL
LYMPHOCYTES #: 2.2 10^3/UL (ref 0.8–2.9)
LYMPHOCYTES %: 27.5 % (ref 15–51)
MAGNESIUM: 2 MG/DL (ref 1.7–2.5)
MEAN CORPUSCULAR HEMOGLOBIN: 28.8 PG (ref 29–33)
MEAN CORPUSCULAR HGB CONC: 32.2 G/DL (ref 32–37)
MEAN CORPUSCULAR VOLUME: 89.4 FL (ref 82–101)
MEAN PLATELET VOLUME: 10.5 FL (ref 7.4–10.4)
MONOCYTE #: 0.4 10^3/UL (ref 0.3–0.9)
MONOCYTES %: 5 % (ref 0–11)
NEUTROPHIL #: 5.3 10^3/UL (ref 1.6–7.5)
NEUTROPHILS %: 65.7 % (ref 39–77)
NUCLEATED RED BLOOD CELLS #: 0 10^3/UL (ref 0–0)
NUCLEATED RED BLOOD CELLS%: 0 /100WBC (ref 0–0)
PHOSPHORUS: 4.1 MG/DL (ref 2.5–4.9)
PLATELET COUNT: 349 10^3/UL (ref 140–415)
POTASSIUM: 3.8 MMOL/L (ref 3.5–5.1)
RED BLOOD COUNT: 4.44 10^6/UL (ref 4.2–5.4)
RED CELL DISTRIBUTION WIDTH: 13.1 % (ref 11.5–14.5)
SODIUM: 142 MMOL/L (ref 135–144)
THYROID STIMULATING HORMONE: 0.61 MIU/L (ref 0.47–4.68)
TRIGLYCERIDES: 145 MG/DL (ref 0–149)
WHITE BLOOD COUNT: 8.1 10^3/UL (ref 4.8–10.8)

## 2019-05-09 RX ADMIN — TRIAMCINOLONE ACETONIDE 1 APPLIC: 1 CREAM TOPICAL at 21:00

## 2019-05-09 RX ADMIN — LEVOTHYROXINE SODIUM 1 MCG: 25 TABLET ORAL at 06:05

## 2019-05-09 RX ADMIN — CLOTRIMAZOLE AND BETAMETHASONE DIPROPIONATE SCH APPLIC: 10; .5 CREAM TOPICAL at 21:00

## 2019-05-09 RX ADMIN — PANTOPRAZOLE SODIUM 1 MG: 40 INJECTION, POWDER, FOR SOLUTION INTRAVENOUS at 17:26

## 2019-05-09 RX ADMIN — LIDOCAINE HYDROCHLORIDE 1 MG: 20 INJECTION, SOLUTION INTRAVENOUS at 14:40

## 2019-05-09 RX ADMIN — CALCIUM GLUCONATE SCH MLS/HR: 94 INJECTION, SOLUTION INTRAVENOUS at 16:46

## 2019-05-09 RX ADMIN — HYDRALAZINE HYDROCHLORIDE 1 MG: 20 INJECTION INTRAMUSCULAR; INTRAVENOUS at 11:25

## 2019-05-09 RX ADMIN — POTASSIUM ACETATE 1 MLS/HR: 3.93 INJECTION, SOLUTION, CONCENTRATE INTRAVENOUS at 03:26

## 2019-05-09 RX ADMIN — ATORVASTATIN CALCIUM 1 MG: 80 TABLET, FILM COATED ORAL at 21:29

## 2019-05-09 RX ADMIN — DEXAMETHASONE SODIUM PHOSPHATE PRN MG: 10 INJECTION, SOLUTION INTRAMUSCULAR; INTRAVENOUS at 02:53

## 2019-05-09 RX ADMIN — PROPOFOL 1: 10 INJECTION, EMULSION INTRAVENOUS at 14:39

## 2019-05-09 RX ADMIN — METOPROLOL TARTRATE 1 MG: 100 TABLET ORAL at 21:29

## 2019-05-09 RX ADMIN — TRIAMCINOLONE ACETONIDE SCH APPLIC: 1 CREAM TOPICAL at 17:26

## 2019-05-09 RX ADMIN — CALCIUM GLUCONATE SCH MLS/HR: 94 INJECTION, SOLUTION INTRAVENOUS at 03:26

## 2019-05-09 RX ADMIN — VERAPAMIL HYDROCHLORIDE 1 MG: 120 TABLET, FILM COATED, EXTENDED RELEASE ORAL at 11:25

## 2019-05-09 RX ADMIN — LORAZEPAM 1 MG: 2 INJECTION, SOLUTION INTRAMUSCULAR; INTRAVENOUS at 03:07

## 2019-05-09 RX ADMIN — POTASSIUM ACETATE 1 MLS/HR: 3.93 INJECTION, SOLUTION, CONCENTRATE INTRAVENOUS at 16:46

## 2019-05-09 RX ADMIN — AMLODIPINE BESYLATE SCH MG: 5 TABLET ORAL at 08:22

## 2019-05-09 RX ADMIN — CLOTRIMAZOLE AND BETAMETHASONE DIPROPIONATE 1 APPLIC: 10; .5 CREAM TOPICAL at 21:00

## 2019-05-09 RX ADMIN — MIRTAZAPINE 1 MG: 15 TABLET, FILM COATED ORAL at 08:22

## 2019-05-09 RX ADMIN — PANTOPRAZOLE SODIUM 1 MG: 40 INJECTION, POWDER, FOR SOLUTION INTRAVENOUS at 06:05

## 2019-05-09 RX ADMIN — METOPROLOL TARTRATE SCH MG: 100 TABLET, FILM COATED ORAL at 21:29

## 2019-05-09 RX ADMIN — TRIAMCINOLONE ACETONIDE SCH APPLIC: 1 CREAM TOPICAL at 13:00

## 2019-05-09 RX ADMIN — ATORVASTATIN CALCIUM SCH MG: 80 TABLET, FILM COATED ORAL at 21:29

## 2019-05-09 RX ADMIN — TRIAMCINOLONE ACETONIDE 1 APPLIC: 1 CREAM TOPICAL at 17:26

## 2019-05-09 RX ADMIN — METOPROLOL TARTRATE SCH MG: 100 TABLET, FILM COATED ORAL at 08:21

## 2019-05-09 RX ADMIN — CLOTRIMAZOLE AND BETAMETHASONE DIPROPIONATE SCH APPLIC: 10; .5 CREAM TOPICAL at 17:27

## 2019-05-09 RX ADMIN — ONDANSETRON HYDROCHLORIDE 1 MG: 2 INJECTION, SOLUTION INTRAMUSCULAR; INTRAVENOUS at 02:53

## 2019-05-09 RX ADMIN — LEVOTHYROXINE SODIUM SCH MCG: 25 TABLET ORAL at 06:05

## 2019-05-09 RX ADMIN — AMLODIPINE BESYLATE 1 MG: 5 TABLET ORAL at 08:22

## 2019-05-09 RX ADMIN — PANTOPRAZOLE SODIUM SCH MG: 40 INJECTION, POWDER, FOR SOLUTION INTRAVENOUS at 17:26

## 2019-05-09 RX ADMIN — TRIAMCINOLONE ACETONIDE SCH APPLIC: 1 CREAM TOPICAL at 21:00

## 2019-05-09 RX ADMIN — TRIAMCINOLONE ACETONIDE 1 APPLIC: 1 CREAM TOPICAL at 13:00

## 2019-05-09 RX ADMIN — PANTOPRAZOLE SODIUM SCH MG: 40 INJECTION, POWDER, FOR SOLUTION INTRAVENOUS at 06:05

## 2019-05-09 RX ADMIN — CLOTRIMAZOLE AND BETAMETHASONE DIPROPIONATE 1 APPLIC: 10; .5 CREAM TOPICAL at 17:27

## 2019-05-09 RX ADMIN — MIRTAZAPINE SCH MG: 15 TABLET, FILM COATED ORAL at 08:22

## 2019-05-09 RX ADMIN — METOPROLOL TARTRATE 1 MG: 100 TABLET ORAL at 08:21

## 2019-05-09 RX ADMIN — ZOLPIDEM TARTRATE 1 MG: 5 TABLET, FILM COATED ORAL at 00:05

## 2019-05-09 NOTE — PAC
Date/Time of Note


Date/Time of Note


DATE: 5/9/19 


TIME: 15:42





Post-Anesthesia Notes


Post-Anesthesia Note


Last documented vital signs





Vital Signs


  Date      Temp  Pulse  Resp  B/P (MAP)   Pulse Ox  O2          O2 Flow    FiO2


Time                                                 Delivery    Rate


    5/9/19  98.6     22    22      158/81        98  Room Air


      1542                          (106)





Activity:  WNL


Respiratory function:  WNL


Cardiovascular function:  WNL


Mental status:  Baseline


Pain reasonably controlled:  Yes


Hydration appropriate:  Yes


Nausea/Vomiting absent:  Yes











ELIZABETH VIERA                  May 9, 2019 15:42

## 2019-05-09 NOTE — PREAC
Date/Time of Note


Date/Time of Note


DATE: 5/9/19 


TIME: 14:26





Anesthesia Eval and Record


Evaluation


Time Pre-Procedure Interview


DATE: 5/9/19 


TIME: 14:26


Age


73


Sex


female


NPO:  8 hrs


Preoperative diagnosis


EPIGASTRIC PAIN


Planned procedure


EGD WITH BX





Past Medical History


Past Medical History:  Includes


Cardio:  HTN, Arrythmia (A FIB)


Endo:  Hypothyroid





Surgery & Anesthesia Issues


No known issue





Meds


Anticoagulation:  No


Beta Blocker within 24 hr:  No


Reason Beta Blocker not given:  Pt. not on B-Blocker


Active Scripts


Ondansetron (Ondansetron Odt) 4 Mg Tab.rapdis, 4 MG PO Q6H PRN for NAUSEA AND/OR


VOMITING, #10 TAB


   Prov:ANNA RAYMUNDO MD         8/8/18


Reported Medications


Acetaminophen* (Acetaminophen*) 500 MG Extra Strength Tablet, 500 MG PO Q4H PRN 


for PAIN AND OR ELEVATED TEMP, TAB


   5/8/19


Nitroglycerin* (Nitrostat*) 0.4 Mg Tab.subl, 0.4 MG SL Q5MIN PRN for CHEST PAIN,


BOTTLE


   5/8/19


Hydralazine Hcl* (Hydralazine Hcl*) 100 Mg Tablet, 1 TAB ORAL TID


   5/8/19


Diltiazem Hcl (DILTIAZEM 24HR CD) 180 Mg Cap.er.24h, 1 CAP ORAL DAILY


   5/8/19


Metoprolol Tartrate (Metoprolol Tartrate) 100 Mg Tablet, 1 TAB ORAL BID


   5/8/19


Aspirin (Aspirin) 81 Mg Chew, 1 TAB ORAL DAILY


   5/8/19


Triamcinolone Acetonide* (Kenalog*) 0.1%-15GM Cr, 1 APPLIC TOP TID, #1 TUB


   2/22/18


Donepezil* (Donepezil*) 10 Mg Tablet, 10 MG PO DAILY, #30 TAB


   2/22/18


Linaclotide (LINZESS) 145 Mcg Capsule, 145 MCG PO DAILY, #30 CAP


   2/22/18


Rosuvastatin Calcium* (Crestor*) 20 Mg Tablet, 20 MG PO QHS, #30 TAB


   2/22/18


Esomeprazole Mag Trihydrate (Nexium) 40 Mg Capsule.dr, 40 MG PO DAILY, #30 CAP


   2/22/18


Mirtazapine* (Mirtazapine*) 30 Mg Tablet, 30 MG PO DAILY, TAB


   2/22/18


Losartan-Hydrochlorothiazide (Losartan-HCTZ) 100-25 Mg Tab, 1 TAB PO DAILY, TAB


   2/22/18


Zolpidem Tartrate* (Zolpidem Tartrate*) 5 Mg Tablet, 5 MG PO QHS PRN for INSOM


YING, #30 TAB


   2/22/18


Amlodipine Besylate* (Norvasc*) 5 Mg Tablet, 5 MG PO DAILY, TAB


   2/22/18


Verapamil Hcl* (Verapamil ER*) 120 Mg Cap24h.pel, 120 MG PO DAILY, CAP


   2/22/18


Clotrimazole-Betamethasone Diprop (Clotrimazole-Betamethasone Diprop) 15 Gm 


Cream.gm., 1 APPLIC TOP BID, TUB


   2/22/18


Clonidine Patch (CLONIDINE PATCH) 0.3 Mg/24 Hr Patch, 1 PATCH.WK TD Q7D, #4 


PATCH.WK


   ON TUESDAY 2/22/18


Levothyroxine Sodium* (Levothyroxine Sodium*) 25 Mcg Tablet, 25 MCG PO BEFORE 


BREAKFAST, #30 TAB


   2/22/18


Meclizine Hcl* (Meclizine Hcl*) 25 Mg Tablet, 25 MG PO BID PRN for DIZZINESS, 


TAB


   2/22/18


Clonidine Hcl* (Clonidine Hcl*) 0.3 Mg Tablet, 0.3 MG PO TID PRN for HTN, TAB


   2/22/18


Discontinued Reported Medications


Tramadol Hcl* (Ultram*) 50 Mg Tablet, 50 MG PO BID PRN for PAIN, TAB


   2/22/18


Metformin Hcl* (Metformin Hcl*) 500 Mg Tablet, 500 MG PO WITH BREAKFAST DINNE, 


#60 TAB


   2/22/18


Naproxen* (Naproxen*) 500 Mg Tablet, 500 MG PO DAILY, TAB


   2/22/18


Hydrochlorothiazide* (Hydrochlorothiazide*) 25 Mg Tab, 25 MG PO DAILY, #30 TAB


   2/22/18


Acetaminophen with Codeine (Acetaminophen-Cod #3 Tablet) 1 Each Tablet, 1 TAB PO


DAILY PRN for AS NEEDED, #20 TAB


   2/22/18


Discontinued Scripts


Hydrocodone/Acetaminophen (Norco  Tablet) 1 Each Tablet, 1 TAB PO Q6H PRN 


for PAIN, #7 TAB


   Prov:ANNA RAYMUNDO MD         8/8/18


Lorazepam* (Ativan*) 0.5 Mg Tablet, 0.5 MG PO TID PRN for ANXIETY, #12 TAB


   Prov:KAYLA STATON MD         3/16/18


Cephalexin* (Keflex*) 500 Mg Capsule, 500 MG PO QID for 5 Days, CAP


   Prov:KAYLA STATON MD         3/16/18





Current Medications


Pantoprazole (Protonix Iv) 40 mg BID@06,18 IV  Last administered on 5/9/19at 


06:05; Admin Dose 40 MG;  Start 5/8/19 at 18:00


Simethicone (Mylicon) 80 mg QID  PRN PO DISTENSION/GAS/BLOATING;  Start 5/8/19 


at 14:00


IV Flush (NS 3 ml) 3 ml PER PROTOCOL IV ;  Start 5/8/19 at 14:30


Ondansetron HCl (Zofran Inj) 4 mg Q6H  PRN IV NAUSEA/VOMITING Last administered 


on 5/9/19at 02:53; Admin Dose 4 MG;  Start 5/8/19 at 14:30


Acetaminophen (Tylenol Tab) 650 mg Q6H  PRN PO .PAIN 1-3 OR TEMP Last 


administered on 5/8/19at 23:59; Admin Dose 650 MG;  Start 5/8/19 at 14:30


Acetaminophen/ Hydrocodone Bitart (Norco (5/325)) 1 tab Q6H  PRN PO .MOD PAIN 4-


6;  Start 5/8/19 at 14:30


Morphine Sulfate (morphine) 2 mg Q4H  PRN IV .SEVERE PAIN 7-10;  Start 5/8/19 at


14:30


Docusate Sodium (Colace) 100 mg Q12H  PRN PO .CONSTIPATION;  Start 5/8/19 at 


14:30


Magnesium Hydroxide (Milk Of Mag) 30 ml DAILY  PRN PO .CONSTIPATION;  Start 


5/8/19 at 14:30


Sodium Chloride 1,000 ml @  75 mls/hr [id number] IV  Last administered on 5/8/19at 


20:13; Admin Dose 75 MLS/HR;  Start 5/8/19 at 14:06


Lorazepam (Ativan) 0.5 mg Q6H  PRN IV ANXIETY Last administered on 5/9/19at 


03:07; Admin Dose 0.5 MG;  Start 5/8/19 at 14:30


Albuterol/ Ipratropium (Duoneb) 3 ml Q4H RESP THERAPY  PRN HHN SHORTNESS OF 


BREATH;  Start 5/8/19 at 14:30


Hydralazine HCl (Apresoline) 10 mg Q6H  PRN IV ELEVATED BLOOD PRESSURE Last 


administered on 5/9/19at 11:25; Admin Dose 10 MG;  Start 5/8/19 at 14:30


Nitroglycerin (Nitroglycerin (Sl Tab) 0.4 Mg) 1 tab Q5M  PRN SL ANGINA;  Start 


5/8/19 at 14:30


Amlodipine Besylate (Norvasc) 5 mg DAILY PO  Last administered on 5/9/19at 


08:22; Admin Dose 5 MG;  Start 5/9/19 at 09:00


Clonidine (Catapres) 0.3 mg TID  PRN PO PRN SBP > 160;  Start 5/8/19 at 14:30


Betamethasone/ Clotrimazole (Lotrisone Cr) 1 applic BID TOP ;  Start 5/8/19 at 


21:00


Hydralazine HCl (Apresoline) 100 mg TID PO  Last administered on 5/9/19at 08:21;


Admin Dose 100 MG;  Start 5/8/19 at 21:00


Levothyroxine Sodium (Synthroid) 25 mcg BEFORE  BREAKFAST PO ;  Start 5/9/19 at 


07:00


Meclizine HCl (Antivert) 25 mg BID  PRN PO DIZZINESS;  Start 5/8/19 at 14:30


Mirtazapine (Remeron) 30 mg DAILY PO  Last administered on 5/9/19at 08:22; Admin


Dose 30 MG;  Start 5/9/19 at 09:00


Triamcinolone Acetonide (Kenalog 0.1% Cr) 1 applic TID TOP ;  Start 5/8/19 at 


21:00


Zolpidem Tartrate (Ambien) 5 mg QHS  PRN PO INSOMNIA Last administered on 


5/9/19at 00:05; Admin Dose 5 MG;  Start 5/8/19 at 14:30


Metoprolol Tartrate (Lopressor) 100 mg BID PO  Last administered on 5/9/19at 


08:21; Admin Dose 100 MG;  Start 5/8/19 at 21:00


Verapamil HCl (Isoptin Sr) 120 mg DAILY PO  Last administered on 5/9/19at 11:25;


Admin Dose 120 MG;  Start 5/9/19 at 09:00


Atorvastatin Calcium (Lipitor) 80 mg DAILY@21 PO  Last administered on 5/8/19at 


20:24; Admin Dose 80 MG;  Start 5/8/19 at 21:00


Diltiazem HCl (Cardizem Cd) 180 mg DAILY PO ;  Start 5/10/19 at 09:00


Meds reviewed:  Yes





Allergies


Coded Allergies:  


     No Known Allergies (Verified  Allergy, Unknown, 5/8/19)


Allergies Reviewed:  Yes





Labs/Studies


Labs Reviewed:  Reviewed by anesthesiologist


Result Diagram:  


5/9/19 0543                                                                     


          5/9/19 0543





Laboratory Tests


5/9/19 05:43








Pregnancy test:  N/A





Pre-procedure Exam


Last vitals





Vital Signs


  Date      Temp  Pulse  Resp  B/P (MAP)   Pulse Ox  O2          O2 Flow    FiO2


Time                                                 Delivery    Rate


    5/9/19  98.6     22    22      158/81        98  Room Air


     14:00                          (106)





Airway:  Adequate mouth opening, Adequate thyromental dist


Mallampati:  Mallampati II


Teeth:  Normal


Lung:  Normal


Heart:  Normal





ASA Physical Status


ASA physical status:  3


Emergency:  None





Planned Anesthetic


General/MAC:  MAC





Planned Pain Management


Parenteral pain med





Pre-operative Attestations


Prior to commencing anesthesia and surgery, the patient was re-evaluated, there 


was verification of:


*The patient's identity


*The results of appropriate recent lab work and preoperative vital signs


*The above evaluation not changing prior to induction


*Anesthetic plan, risk benefits, alternative and complications discussed with 


patient/family; questions answered; patient/family understands, accepts and 


wishes to proceed.











ELIZABETH VIERA                  May 9, 2019 14:28

## 2019-05-09 NOTE — HPN
Date/Time of Note


Date/Time of Note


DATE: 5/9/19 


TIME: 15:17





Interval H&P Admission Note


Pt. seen H&P reviewed:  No system changes











ALEXANDER LUCIO                      May 9, 2019 15:17

## 2019-05-09 NOTE — RADRPT
Vent Rate: 118 bpm

RR Interval: 0 msec

UT Interval: 0 msec

QRS Duration: 78 msec

QT Interval: 334 msec

QTC Interval: 468 msec

P-R-T Axis: 0 - -13 - 58 degrees

 

 Atrial fibrillation with rapid ventricular response with premature 

ventricular or aberrantly conducted complexes

 Abnormal ECG

 

Electronically Signed By: Ion Rodriguez

## 2019-05-10 VITALS — HEART RATE: 86 BPM

## 2019-05-10 VITALS — HEART RATE: 66 BPM | DIASTOLIC BLOOD PRESSURE: 64 MMHG | RESPIRATION RATE: 18 BRPM | SYSTOLIC BLOOD PRESSURE: 106 MMHG

## 2019-05-10 VITALS — DIASTOLIC BLOOD PRESSURE: 84 MMHG | RESPIRATION RATE: 16 BRPM | HEART RATE: 60 BPM | SYSTOLIC BLOOD PRESSURE: 160 MMHG

## 2019-05-10 VITALS — DIASTOLIC BLOOD PRESSURE: 79 MMHG | RESPIRATION RATE: 19 BRPM | SYSTOLIC BLOOD PRESSURE: 111 MMHG | HEART RATE: 90 BPM

## 2019-05-10 VITALS — HEART RATE: 104 BPM | SYSTOLIC BLOOD PRESSURE: 116 MMHG | DIASTOLIC BLOOD PRESSURE: 78 MMHG | RESPIRATION RATE: 16 BRPM

## 2019-05-10 VITALS — HEART RATE: 90 BPM

## 2019-05-10 VITALS — HEART RATE: 107 BPM

## 2019-05-10 LAB
ADD MAN DIFF?: NO
ANION GAP: 7 (ref 5–13)
BASOPHIL #: 0 10^3/UL (ref 0–0.1)
BASOPHILS %: 0.4 % (ref 0–2)
BLOOD UREA NITROGEN: 9 MG/DL (ref 7–20)
CALCIUM: 9 MG/DL (ref 8.4–10.2)
CARBON DIOXIDE: 23 MMOL/L (ref 21–31)
CHLORIDE: 113 MMOL/L (ref 97–110)
CREATININE: 0.69 MG/DL (ref 0.44–1)
EOSINOPHILS #: 0.1 10^3/UL (ref 0–0.5)
EOSINOPHILS %: 1.1 % (ref 0–7)
GLUCOSE: 115 MG/DL (ref 70–220)
HEMATOCRIT: 39.6 % (ref 37–47)
HEMOGLOBIN: 12.6 G/DL (ref 12–16)
IMMATURE GRANS #M: 0.03 10^3/UL (ref 0–0.03)
IMMATURE GRANS % (M): 0.4 % (ref 0–0.43)
LYMPHOCYTES #: 2.3 10^3/UL (ref 0.8–2.9)
LYMPHOCYTES %: 33.2 % (ref 15–51)
MEAN CORPUSCULAR HEMOGLOBIN: 28.6 PG (ref 29–33)
MEAN CORPUSCULAR HGB CONC: 31.8 G/DL (ref 32–37)
MEAN CORPUSCULAR VOLUME: 89.8 FL (ref 82–101)
MEAN PLATELET VOLUME: 10.3 FL (ref 7.4–10.4)
MONOCYTE #: 0.4 10^3/UL (ref 0.3–0.9)
MONOCYTES %: 6 % (ref 0–11)
NEUTROPHIL #: 4.1 10^3/UL (ref 1.6–7.5)
NEUTROPHILS %: 58.9 % (ref 39–77)
NUCLEATED RED BLOOD CELLS #: 0 10^3/UL (ref 0–0)
NUCLEATED RED BLOOD CELLS%: 0 /100WBC (ref 0–0)
PLATELET COUNT: 353 10^3/UL (ref 140–415)
POTASSIUM: 3.6 MMOL/L (ref 3.5–5.1)
RED BLOOD COUNT: 4.41 10^6/UL (ref 4.2–5.4)
RED CELL DISTRIBUTION WIDTH: 13.5 % (ref 11.5–14.5)
SODIUM: 143 MMOL/L (ref 135–144)
WHITE BLOOD COUNT: 7 10^3/UL (ref 4.8–10.8)

## 2019-05-10 RX ADMIN — VERAPAMIL HYDROCHLORIDE 1 MG: 120 TABLET, FILM COATED, EXTENDED RELEASE ORAL at 09:13

## 2019-05-10 RX ADMIN — MIRTAZAPINE SCH MG: 15 TABLET, FILM COATED ORAL at 09:00

## 2019-05-10 RX ADMIN — AMLODIPINE BESYLATE 1 MG: 5 TABLET ORAL at 09:14

## 2019-05-10 RX ADMIN — PANTOPRAZOLE SODIUM SCH MG: 40 INJECTION, POWDER, FOR SOLUTION INTRAVENOUS at 06:24

## 2019-05-10 RX ADMIN — DILTIAZEM HYDROCHLORIDE 1 MG: 180 CAPSULE, COATED, EXTENDED RELEASE ORAL at 09:14

## 2019-05-10 RX ADMIN — POTASSIUM ACETATE 1 MLS/HR: 3.93 INJECTION, SOLUTION, CONCENTRATE INTRAVENOUS at 06:06

## 2019-05-10 RX ADMIN — CALCIUM GLUCONATE SCH MLS/HR: 94 INJECTION, SOLUTION INTRAVENOUS at 06:06

## 2019-05-10 RX ADMIN — MIRTAZAPINE 1 MG: 15 TABLET, FILM COATED ORAL at 09:00

## 2019-05-10 RX ADMIN — ACETAMINOPHEN 1 MG: 325 TABLET, FILM COATED ORAL at 14:31

## 2019-05-10 RX ADMIN — CLOTRIMAZOLE AND BETAMETHASONE DIPROPIONATE SCH APPLIC: 10; .5 CREAM TOPICAL at 09:13

## 2019-05-10 RX ADMIN — PANTOPRAZOLE SODIUM 1 MG: 40 INJECTION, POWDER, FOR SOLUTION INTRAVENOUS at 06:24

## 2019-05-10 RX ADMIN — TRIAMCINOLONE ACETONIDE 1 APPLIC: 1 CREAM TOPICAL at 09:13

## 2019-05-10 RX ADMIN — LEVOTHYROXINE SODIUM SCH MCG: 25 TABLET ORAL at 06:24

## 2019-05-10 RX ADMIN — HYDROCODONE BITARTRATE AND ACETAMINOPHEN 1 TAB: 5; 325 TABLET ORAL at 04:17

## 2019-05-10 RX ADMIN — METOPROLOL TARTRATE SCH MG: 100 TABLET, FILM COATED ORAL at 09:14

## 2019-05-10 RX ADMIN — METOPROLOL TARTRATE 1 MG: 100 TABLET ORAL at 09:14

## 2019-05-10 RX ADMIN — CLOTRIMAZOLE AND BETAMETHASONE DIPROPIONATE 1 APPLIC: 10; .5 CREAM TOPICAL at 09:13

## 2019-05-10 RX ADMIN — TRIAMCINOLONE ACETONIDE SCH APPLIC: 1 CREAM TOPICAL at 09:13

## 2019-05-10 RX ADMIN — AMLODIPINE BESYLATE SCH MG: 5 TABLET ORAL at 09:14

## 2019-05-10 RX ADMIN — LEVOTHYROXINE SODIUM 1 MCG: 25 TABLET ORAL at 06:24

## 2019-05-10 NOTE — DS
Date/Time of Note


Date/Time of Note


DATE: 5/10/19 


TIME: 11:22





Discharge Summary


Admission/Discharge Info


Admit Date/Time


May 8, 2019 at 15:32


Discharge Date/Time





Discharge Diagnosis


# Epigastric pain- likely secondary to gastritis -confirmed on EGD, improved now


#  Hypertension


#  History of hypothyroidism.


Patient Condition:  Stable


Hx of Present Illness


73-year-old female with past medical history of hypertension, hypothyroidism, 


possible anxiety, questionable diabetes, who comes in with epigastric pain.  


Most of the information is obtained from ER documentation as the patient is 


unable to provide full HPI secondary to a language barrier at this time.  Some 


subjective fevers.  Her symptoms have been going on for the last 2 days.  She 


had some nausea symptoms, but not vomiting.  In the ER, to the staff she denied 


chest pain or shortness of breath, no upper or lower GI bleeding.   That is the 


full extent of the review of systems that could be obtained at this time.  Appar


ently the patient has a prior history of gastritis as well, ran out of her 


medications for this a few months ago.  When she arrived, she was seen by GI 


team and in the ER today, she had elevated blood pressure 177/105 and also for 


epigastric pain she received pain control medications and GI cocktail as well as


Pepcid and Protonix.


Hospital Course


Patient was admitted and seen by GI team during this hospital stay.  Patient 


underwent EGD that showed mild gastritis and small hiatal hernia.  Patient 


symptoms improved after getting the GI cocktail along with the IV PPI.  She was 


able to ambulate, tolerated p.o. diet.  She took her blood pressure medicines 


and her blood pressure was stable overall during the hospital stay.  Labs are 


stable as well.  Her A1c was found to be 5.7.  Patient was strongly encouraged 


to adhere to her new medications for her mild gastritis and will be discharged 


later today home in improved condition.  See below for full list of discharge 


medications.


Home Meds


Active Scripts


Simethicone* (Mylicon*) 80 Mg Tab, 80 MG PO QID PRN for DISTENSION/GAS/BLOATING,


#120 TAB 1 Refill


   Prov:MAXIMINO SAMANIEGO S.         5/10/19


Pantoprazole* (Pantoprazole*) 40 Mg Tablet.dr 40 MG PO BID@06,18, #60 2 Refills


   Prov:MAXIMINO SAMANIEGO S.         5/10/19


Ondansetron (Ondansetron Odt) 4 Mg Tab.rapdis, 4 MG PO Q6H PRN for NAUSEA AND/OR


VOMITING, #10 TAB


   Prov:ANNA RAYMUNDO MD         8/8/18


Reported Medications


Acetaminophen* (Acetaminophen*) 500 MG Extra Strength Tablet, 500 MG PO Q4H PRN 


for PAIN AND OR ELEVATED TEMP, TAB


   5/8/19


Nitroglycerin* (Nitrostat*) 0.4 Mg Tab.subl, 0.4 MG SL Q5MIN PRN for CHEST PAIN,


BOTTLE


   5/8/19


Hydralazine Hcl* (Hydralazine Hcl*) 100 Mg Tablet, 1 TAB ORAL TID


   5/8/19


Diltiazem Hcl (DILTIAZEM 24HR CD) 180 Mg Cap.er.24h, 1 CAP ORAL DAILY


   5/8/19


Metoprolol Tartrate (Metoprolol Tartrate) 100 Mg Tablet, 1 TAB ORAL BID


   5/8/19


Aspirin (Aspirin) 81 Mg Chew, 1 TAB ORAL DAILY


   5/8/19


Triamcinolone Acetonide* (Kenalog*) 0.1%-15GM Cr, 1 APPLIC TOP TID, #1 TUB


   2/22/18


Donepezil* (Donepezil*) 10 Mg Tablet, 10 MG PO DAILY, #30 TAB


   2/22/18


Linaclotide (LINZESS) 145 Mcg Capsule, 145 MCG PO DAILY, #30 CAP


   2/22/18


Rosuvastatin Calcium* (Crestor*) 20 Mg Tablet, 20 MG PO QHS, #30 TAB


   2/22/18


Esomeprazole Mag Trihydrate (Nexium) 40 Mg Capsule.dr, 40 MG PO DAILY, #30 CAP


   2/22/18


Mirtazapine* (Mirtazapine*) 30 Mg Tablet, 30 MG PO DAILY, TAB


   2/22/18


Losartan-Hydrochlorothiazide (Losartan-HCTZ) 100-25 Mg Tab, 1 TAB PO DAILY, TAB


   2/22/18


Zolpidem Tartrate* (Zolpidem Tartrate*) 5 Mg Tablet, 5 MG PO QHS PRN for 


INSOMNIA, #30 TAB


   2/22/18


Amlodipine Besylate* (Norvasc*) 5 Mg Tablet, 5 MG PO DAILY, TAB


   2/22/18


Verapamil Hcl* (Verapamil ER*) 120 Mg Cap24h.pel, 120 MG PO DAILY, CAP


   2/22/18


Clotrimazole-Betamethasone Diprop (Clotrimazole-Betamethasone Diprop) 15 Gm 


Cream.gm., 1 APPLIC TOP BID, TUB


   2/22/18


Clonidine Patch (CLONIDINE PATCH) 0.3 Mg/24 Hr Patch, 1 PATCH.WK TD Q7D, #4 


PATCH.WK


   ON TUESDAY 2/22/18


Levothyroxine Sodium* (Levothyroxine Sodium*) 25 Mcg Tablet, 25 MCG PO BEFORE 


BREAKFAST, #30 TAB


   2/22/18


Meclizine Hcl* (Meclizine Hcl*) 25 Mg Tablet, 25 MG PO BID PRN for DIZZINESS, 


TAB


   2/22/18


Clonidine Hcl* (Clonidine Hcl*) 0.3 Mg Tablet, 0.3 MG PO TID PRN for HTN, TAB


   2/22/18


Discontinued Reported Medications


Tramadol Hcl* (Ultram*) 50 Mg Tablet, 50 MG PO BID PRN for PAIN, TAB


   2/22/18


Metformin Hcl* (Metformin Hcl*) 500 Mg Tablet, 500 MG PO WITH BREAKFAST DINNE, 


#60 TAB


   2/22/18


Naproxen* (Naproxen*) 500 Mg Tablet, 500 MG PO DAILY, TAB


   2/22/18


Hydrochlorothiazide* (Hydrochlorothiazide*) 25 Mg Tab, 25 MG PO DAILY, #30 TAB


   2/22/18


Acetaminophen with Codeine (Acetaminophen-Cod #3 Tablet) 1 Each Tablet, 1 TAB PO


DAILY PRN for AS NEEDED, #20 TAB


   2/22/18


Discontinued Scripts


Hydrocodone/Acetaminophen (Norco  Tablet) 1 Each Tablet, 1 TAB PO Q6H PRN 


for PAIN, #7 TAB


   Prov:ANNA RAYMUNDO MD         8/8/18


Lorazepam* (Ativan*) 0.5 Mg Tablet, 0.5 MG PO TID PRN for ANXIETY, #12 TAB


   Prov:KAYLA STATON MD         3/16/18


Cephalexin* (Keflex*) 500 Mg Capsule, 500 MG PO QID for 5 Days, CAP


   Prov:KAYLA STATON MD         3/16/18


Follow-up Plan


Please take your medications as prescribed, see your doctor in the clinic in the


next 1 week.


Primary Care Provider


Not On Staff Doctor


Time spent on discharge:   > 30 minutes


Pending Labs





Laboratory Tests


         Test
                                            5/10/19
05:43


         White Blood Count
                      7.0 10^3/ul
(4.8-10.8)


         Red Blood Count
                      4.41 10^6/ul
(4.20-5.40)


         Hemoglobin
                              12.6 g/dl
(12.0-16.0)


         Hematocrit
                                 39.6 %
(37.0-47.0)


         Mean Corpuscular Volume
                  89.8 fl
(82.0-101.0)


         Mean Corpuscular Hemoglobin
               28.6 pg
(29.0-33.0)


         Mean Corpuscular Hemoglobin
Concent      31.8 g/dl
(32.0-37.0)


         Red Cell Distribution Width
                13.5 %
(11.5-14.5)


         Platelet Count
                          353 10^3/UL
(140-415)


         Mean Platelet Volume
                       10.3 fl
(7.4-10.4)


         Immature Granulocytes %
                 0.400 %
(0.001-0.429)


         Neutrophils %
                              58.9 %
(39.0-77.0)


         Lymphocytes %
                              33.2 %
(15.0-51.0)


         Monocytes %
                                  6.0 %
(0.0-11.0)


         Eosinophils %
                                 1.1 %
(0.0-7.0)


         Basophils %
                                   0.4 %
(0.0-2.0)


         Nucleated Red Blood Cells %
             0.0 /100WBC
(0.0-0.0)


         Immature Granulocytes #
             0.030 10^3/ul
(0.0-0.031)


         Neutrophils #
                           4.1 10^3/ul
(1.6-7.5)


         Lymphocytes #
                           2.3 10^3/ul
(0.8-2.9)


         Monocytes #
                             0.4 10^3/ul
(0.3-0.9)


         Eosinophils #
                           0.1 10^3/ul
(0.0-0.5)


         Basophils #
                             0.0 10^3/ul
(0.0-0.1)


         Nucleated Red Blood Cells #
             0.0 10^3/ul
(0.0-0.0)


         Sodium Level
                             143 mmol/L
(135-144)


         Potassium Level
                          3.6 mmol/L
(3.5-5.1)


         Chloride Level
                            113 mmol/L
()


         Carbon Dioxide Level
                        23 mmol/L
(21-31)


         Anion Gap                                            7 (5-13)


         Blood Urea Nitrogen                            9 mg/dl (7-20)


         Creatinine
                             0.69 mg/dl
(0.44-1.00)


         Est Glomerular Filtrat Rate
mL/min    mL/min (>60) 



         Glucose Level
                              115 mg/dl
()


         Calcium Level
                            9.0 mg/dl
(8.4-10.2)














MAXIMINO SAMANIEGO              May 10, 2019 11:27

## 2019-05-10 NOTE — PDOCDIS
Discharge Instructions


CONDITION


                [id number]
Patient Condition:  [id number]
Stable








HOME CARE INSTRUCTIONS:


         [id number]
Diet Instructions:  [id number]
Low Fat /Cholesterol








ACTIVITY:


     [id number]
Activity Restrictions:  [id number]
Slowly Increase Activity


                                        Rest between Activity


                                        Avoid heavy lifting








FOLLOW UP/APPOINTMENTS


Follow-up Plan


Please take your medications as prescribed, see your doctor in the clinic in the


next 1 week.











MAXIMINO SAMANIEGO              May 10, 2019 11:18

## 2019-05-19 ENCOUNTER — HOSPITAL ENCOUNTER (EMERGENCY)
Dept: HOSPITAL 91 - E/R | Age: 74
Discharge: HOME | End: 2019-05-19
Payer: MEDICARE

## 2019-05-19 ENCOUNTER — HOSPITAL ENCOUNTER (EMERGENCY)
Dept: HOSPITAL 10 - E/R | Age: 74
Discharge: HOME | End: 2019-05-19
Payer: MEDICARE

## 2019-05-19 VITALS — HEART RATE: 98 BPM | DIASTOLIC BLOOD PRESSURE: 85 MMHG | RESPIRATION RATE: 16 BRPM | SYSTOLIC BLOOD PRESSURE: 156 MMHG

## 2019-05-19 VITALS — HEIGHT: 55 IN | WEIGHT: 156.75 LBS | BODY MASS INDEX: 36.28 KG/M2

## 2019-05-19 DIAGNOSIS — K29.50: Primary | ICD-10-CM

## 2019-05-19 DIAGNOSIS — R40.2252: ICD-10-CM

## 2019-05-19 DIAGNOSIS — I10: ICD-10-CM

## 2019-05-19 DIAGNOSIS — R40.2142: ICD-10-CM

## 2019-05-19 DIAGNOSIS — Z79.82: ICD-10-CM

## 2019-05-19 DIAGNOSIS — E03.9: ICD-10-CM

## 2019-05-19 DIAGNOSIS — R40.2362: ICD-10-CM

## 2019-05-19 LAB
ADD MAN DIFF?: NO
ALANINE AMINOTRANSFERASE: 12 IU/L (ref 13–69)
ALBUMIN/GLOBULIN RATIO: 1.37
ALBUMIN: 4 G/DL (ref 3.3–4.9)
ALKALINE PHOSPHATASE: 73 IU/L (ref 42–121)
ANION GAP: 9 (ref 5–13)
ASPARTATE AMINO TRANSFERASE: 19 IU/L (ref 15–46)
BASOPHIL #: 0 10^3/UL (ref 0–0.1)
BASOPHILS %: 0.2 % (ref 0–2)
BILIRUBIN,DIRECT: 0 MG/DL (ref 0–0.2)
BILIRUBIN,TOTAL: 0.4 MG/DL (ref 0.2–1.3)
BLOOD UREA NITROGEN: 10 MG/DL (ref 7–20)
CALCIUM: 9.3 MG/DL (ref 8.4–10.2)
CARBON DIOXIDE: 22 MMOL/L (ref 21–31)
CHLORIDE: 110 MMOL/L (ref 97–110)
CREATININE: 0.76 MG/DL (ref 0.44–1)
EOSINOPHILS #: 0 10^3/UL (ref 0–0.5)
EOSINOPHILS %: 0.4 % (ref 0–7)
GLOBULIN: 2.9 G/DL (ref 1.3–3.2)
GLUCOSE: 102 MG/DL (ref 70–220)
HEMATOCRIT: 35.2 % (ref 37–47)
HEMOGLOBIN: 11.3 G/DL (ref 12–16)
IMMATURE GRANS #M: 0.03 10^3/UL (ref 0–0.03)
IMMATURE GRANS % (M): 0.3 % (ref 0–0.43)
LYMPHOCYTES #: 2.1 10^3/UL (ref 0.8–2.9)
LYMPHOCYTES %: 21.7 % (ref 15–51)
MEAN CORPUSCULAR HEMOGLOBIN: 28.7 PG (ref 29–33)
MEAN CORPUSCULAR HGB CONC: 32.1 G/DL (ref 32–37)
MEAN CORPUSCULAR VOLUME: 89.3 FL (ref 82–101)
MEAN PLATELET VOLUME: 10.3 FL (ref 7.4–10.4)
MONOCYTE #: 0.4 10^3/UL (ref 0.3–0.9)
MONOCYTES %: 3.8 % (ref 0–11)
NEUTROPHIL #: 7.1 10^3/UL (ref 1.6–7.5)
NEUTROPHILS %: 73.6 % (ref 39–77)
NUCLEATED RED BLOOD CELLS #: 0 10^3/UL (ref 0–0)
NUCLEATED RED BLOOD CELLS%: 0 /100WBC (ref 0–0)
PLATELET COUNT: 342 10^3/UL (ref 140–415)
POTASSIUM: 3.9 MMOL/L (ref 3.5–5.1)
RED BLOOD COUNT: 3.94 10^6/UL (ref 4.2–5.4)
RED CELL DISTRIBUTION WIDTH: 13.2 % (ref 11.5–14.5)
SODIUM: 141 MMOL/L (ref 135–144)
TOTAL PROTEIN: 6.9 G/DL (ref 6.1–8.1)
WHITE BLOOD COUNT: 9.6 10^3/UL (ref 4.8–10.8)

## 2019-05-19 PROCEDURE — 96375 TX/PRO/DX INJ NEW DRUG ADDON: CPT

## 2019-05-19 PROCEDURE — 99284 EMERGENCY DEPT VISIT MOD MDM: CPT

## 2019-05-19 PROCEDURE — 96374 THER/PROPH/DIAG INJ IV PUSH: CPT

## 2019-05-19 PROCEDURE — 80053 COMPREHEN METABOLIC PANEL: CPT

## 2019-05-19 PROCEDURE — 85025 COMPLETE CBC W/AUTO DIFF WBC: CPT

## 2019-05-19 RX ADMIN — METOCLOPRAMIDE HYDROCHLORIDE 1 MG: 10 INJECTION, SOLUTION INTRAMUSCULAR; INTRAVENOUS at 18:36

## 2019-05-19 RX ADMIN — LIDOCAINE HYDROCHLORIDE 1 MLS/HR: 10 INJECTION, SOLUTION EPIDURAL; INFILTRATION; INTRACAUDAL; PERINEURAL at 19:51

## 2019-05-19 RX ADMIN — LORAZEPAM 1 MG: 2 INJECTION, SOLUTION INTRAMUSCULAR; INTRAVENOUS at 18:55

## 2019-05-19 RX ADMIN — FAMOTIDINE 1 MG: 10 INJECTION, SOLUTION INTRAVENOUS at 19:51

## 2019-05-19 RX ADMIN — ONDANSETRON HYDROCHLORIDE 1 MG: 2 INJECTION, SOLUTION INTRAMUSCULAR; INTRAVENOUS at 19:51

## 2019-05-20 NOTE — ERD
ER Documentation


Chief Complaint


Chief Complaint





AP WITH NAUSEA X 1 WEEK





HPI


73-year-old female with a recent diagnosis of gastritis presenting with severe 


nausea for the past 1 week.  Although in the chief complaint states that she has


abdominal pain, patient states that she is having no pain at this time.  She is 


only having severe nausea but no vomiting.  She is eating and drinking by 


forcing herself.  No melena or hematochezia.  She is on pantoprazole twice daily


as prescribed from the hospital.  However this is not helping with her nausea.  


She also has Zofran at home which is not helping with her symptoms.  Patient has


no other associated complaints.  She is requesting medications for control of 


her nausea.  She is refusing any labs be done initially.





ROS


All systems reviewed and are negative except as per history of present illness.





Medications


Home Meds


Active Scripts


Metoclopramide* (Reglan*) 10 Mg Tablet, 10 MG PO Q6 PRN for NAUSEA AND/OR 


VOMITING, #20 TAB


   Prov:RADHA DIAZ MD         5/19/19


Simethicone* (Mylicon*) 80 Mg Tab, 80 MG PO QID PRN for DISTENSION/GAS/BLOATING,


#120 TAB 1 Refill


   Prov:MAXIMINO SAMANIEGO S.         5/10/19


Pantoprazole* (Pantoprazole*) 40 Mg Tablet.dr, 40 MG PO BID@06,18, #60 2 Refills


   Prov:MAXIMINO SAMANIEGO S.         5/10/19


Ondansetron (Ondansetron Odt) 4 Mg Tab.rapdis, 4 MG PO Q6H PRN for NAUSEA AND/OR


VOMITING, #10 TAB


   Prov:ANNA RAYMUNDO MD         8/8/18


Reported Medications


Acetaminophen* (Acetaminophen*) 500 MG Extra Strength Tablet, 500 MG PO Q4H PRN 


for PAIN AND OR ELEVATED TEMP, TAB


   5/8/19


Nitroglycerin* (Nitrostat*) 0.4 Mg Tab.subl, 0.4 MG SL Q5MIN PRN for CHEST PAIN,


BOTTLE


   5/8/19


Hydralazine Hcl* (Hydralazine Hcl*) 100 Mg Tablet, 1 TAB ORAL TID


   5/8/19


Diltiazem Hcl (DILTIAZEM 24HR CD) 180 Mg Cap.er.24h, 1 CAP ORAL DAILY


   5/8/19


Metoprolol Tartrate (Metoprolol Tartrate) 100 Mg Tablet, 1 TAB ORAL BID


   5/8/19


Aspirin (Aspirin) 81 Mg Chew, 1 TAB ORAL DAILY


   5/8/19


Triamcinolone Acetonide* (Kenalog*) 0.1%-15GM Cr, 1 APPLIC TOP TID, #1 TUB


   2/22/18


Donepezil* (Donepezil*) 10 Mg Tablet, 10 MG PO DAILY, #30 TAB


   2/22/18


Linaclotide (LINZESS) 145 Mcg Capsule, 145 MCG PO DAILY, #30 CAP


   2/22/18


Rosuvastatin Calcium* (Crestor*) 20 Mg Tablet, 20 MG PO QHS, #30 TAB


   2/22/18


Esomeprazole Mag Trihydrate (Nexium) 40 Mg Capsule.dr, 40 MG PO DAILY, #30 CAP


   2/22/18


Mirtazapine* (Mirtazapine*) 30 Mg Tablet, 30 MG PO DAILY, TAB


   2/22/18


Losartan-Hydrochlorothiazide (Losartan-HCTZ) 100-25 Mg Tab, 1 TAB PO DAILY, TAB


   2/22/18


Zolpidem Tartrate* (Zolpidem Tartrate*) 5 Mg Tablet, 5 MG PO QHS PRN for 


INSOMNIA, #30 TAB


   2/22/18


Amlodipine Besylate* (Norvasc*) 5 Mg Tablet, 5 MG PO DAILY, TAB


   2/22/18


Verapamil Hcl* (Verapamil ER*) 120 Mg Cap24h.pel, 120 MG PO DAILY, CAP


   2/22/18


Clotrimazole-Betamethasone Diprop (Clotrimazole-Betamethasone Diprop) 15 Gm 


Cream.gm., 1 APPLIC TOP BID, TUB


   2/22/18


Clonidine Patch (CLONIDINE PATCH) 0.3 Mg/24 Hr Patch, 1 PATCH.WK TD Q7D, #4 PATC


H.WK


   ON TUESDAY 2/22/18


Levothyroxine Sodium* (Levothyroxine Sodium*) 25 Mcg Tablet, 25 MCG PO BEFORE 


BREAKFAST, #30 TAB


   2/22/18


Meclizine Hcl* (Meclizine Hcl*) 25 Mg Tablet, 25 MG PO BID PRN for DIZZINESS, 


TAB


   2/22/18


Clonidine Hcl* (Clonidine Hcl*) 0.3 Mg Tablet, 0.3 MG PO TID PRN for HTN, TAB


   2/22/18





Allergies


Allergies:  


Coded Allergies:  


     No Known Allergies (Verified  Allergy, Unknown, 5/8/19)





PMhx/Soc


History of Surgery:  No


Anesthesia Reaction:  No


Hx Neurological Disorder:  No


Hx Respiratory Disorders:  No


Hx Cardiac Disorders:  Yes (htn, afib)


Hx Psychiatric Problems:  No


Hx Miscellaneous Medical Probl:  Yes (HTN , hypothyroidism, gastritis)


Hx Alcohol Use:  No


Hx Substance Use:  No


Hx Tobacco Use:  No


Smoking Status:  Never smoker





FmHx


Family History:  No diabetes





Physical Exam


Vitals





Vital Signs


  Date      Temp  Pulse  Resp  B/P (MAP)   Pulse Ox  O2          O2 Flow    FiO2


Time                                                 Delivery    Rate


   5/19/19           98    16      156/85        99  Room Air


     22:20                          (108)


   5/19/19          102    18      150/79            Room Air


     19:55                          (102)


   5/19/19  98.2     95    18      159/82        99


     17:49                          (107)





Physical Exam


Const:   Moaning in distress secondary to nausea, nontoxic, no diaphoresis


Head:   Atraumatic 


Eyes:    Normal Conjunctiva


ENT:    Normal External Ears, Nose and Mouth.


Neck:               Full range of motion. No meningismus.


Resp:   Clear to auscultation bilaterally


Cardio:   Regular rate and rhythm, no murmurs


Abd:    Soft, non tender, non distended. Normal bowel sounds


Skin:   No petechiae or rashes


Back:   No midline or flank tenderness


Ext:    No cyanosis, or edema


Neur:   Awake and alert


Psych:    Normal Mood and Affect


Result Diagram:  


5/19/19 1820                                                                    


           5/19/19 1820





Results 24 hrs





Laboratory Tests


              Test
                                 5/19/19
18:20


              White Blood Count                      9.6 10^3/ul


              Red Blood Count                       3.94 10^6/ul


              Hemoglobin                               11.3 g/dl


              Hematocrit                                  35.2 %


              Mean Corpuscular Volume                    89.3 fl


              Mean Corpuscular Hemoglobin                28.7 pg


              Mean Corpuscular Hemoglobin
Concent     32.1 g/dl 



              Red Cell Distribution Width                 13.2 %


              Platelet Count                         342 10^3/UL


              Mean Platelet Volume                       10.3 fl


              Immature Granulocytes %                    0.300 %


              Neutrophils %                               73.6 %


              Lymphocytes %                               21.7 %


              Monocytes %                                  3.8 %


              Eosinophils %                                0.4 %


              Basophils %                                  0.2 %


              Nucleated Red Blood Cells %            0.0 /100WBC


              Immature Granulocytes #              0.030 10^3/ul


              Neutrophils #                          7.1 10^3/ul


              Lymphocytes #                          2.1 10^3/ul


              Monocytes #                            0.4 10^3/ul


              Eosinophils #                          0.0 10^3/ul


              Basophils #                            0.0 10^3/ul


              Nucleated Red Blood Cells #            0.0 10^3/ul


              Sodium Level                            141 mmol/L


              Potassium Level                         3.9 mmol/L


              Chloride Level                          110 mmol/L


              Carbon Dioxide Level                     22 mmol/L


              Anion Gap                                        9


              Blood Urea Nitrogen                       10 mg/dl


              Creatinine                              0.76 mg/dl


              Est Glomerular Filtrat Rate
mL/min    mL/min 



              Glucose Level                            102 mg/dl


              Calcium Level                            9.3 mg/dl


              Total Bilirubin                          0.4 mg/dl


              Direct Bilirubin                        0.00 mg/dl


              Indirect Bilirubin                       0.4 mg/dl


              Aspartate Amino Transf
(AST/SGOT)         19 IU/L 



              Alanine Aminotransferase
(ALT/SGPT)       12 IU/L 



              Alkaline Phosphatase                       73 IU/L


              Total Protein                             6.9 g/dl


              Albumin                                   4.0 g/dl


              Globulin                                 2.90 g/dl


              Albumin/Globulin Ratio                        1.37





Current Medications


 Medications
   Dose
          Sig/Britni
       Start Time
   Status  Last


 (Trade)       Ordered        Route
 PRN     Stop Time              Admin
Dose


                              Reason                                Admin


                10 mg          ONCE  ONCE
    5/19/19       DC           5/19/19


Metoclopramid                 IV
            18:30
                       18:36



e HCl
                                       5/19/19 18:31


(Reglan)


 Lorazepam
     0.5 mg         ONCE  ONCE
    5/19/19       DC           5/19/19


(Ativan)                      IV
            19:00
                       18:55



                                             5/19/19 19:01


 Sodium         500 ml @ 
     Q1H STAT
      5/19/19       DC           5/19/19


Chloride       500 mls/hr     IV
            19:27
                       19:51



                                             5/19/19 20:26


 Ondansetron    4 mg           ONCE  STAT
    5/19/19       DC           5/19/19


HCl
  (Zofran                 IV
            19:27
                       19:51



Inj)                                         5/19/19 19:29


 Famotidine
    20 mg          ONCE  STAT
    5/19/19       DC           5/19/19


(Pepcid Iv)                   IV
            19:27
                       19:51



                                             5/19/19 19:29








Procedures/MDM


EMERGENT LABS AND DIAGNOSTIC STUDIES: 


Lab Results above were reviewed and interpreted by me. 





CBC: no clinically significant anemia or evidence of infection


CMP: No evidence of clinically significant electrolyte abnormality, acidosis, 


renal failure, hypoglycemia, liver disease, or biliary obstruction





12-lead EKG was interpreted by MOSHE Diaz MD: 


Atrial fibrillation with RVR rate of 111 beats per minute 


Normal axis 


Normal intervals 


No acute ST or T wave changes suggestive of acute ischemia or STEMI. 


___________________________________________________________ 


Initial Nursing notes reviewed. 


Previous Medical Records requested via the Electronic Health Record. 





EMERGENCY DEPARTMENT COURSE / MEDICAL DECISION MAKING: 





Patient is presenting with severe nausea with no other associated symptoms.  She


is afebrile with unremarkable vitals.  No evidence of acute surgical abdomen.  I


do not suspect neurologic etiology of her symptoms.  I suspect her nausea is 


secondary to her gastritis.  Patient was treated with IV fluids, Reglan, Zofran 


with improvement of her symptoms.  Prescription for Reglan given.  Patient to 


return for any worsening symptoms.  Follow-up with PCP recommended within the 


next 2 to 3 days.








Patient's blood pressure was elevated (>120/80) but appears stable without 


evidence of hypertensive emergency or urgency. The patient was counseled about 


the risks of hypertension and urged to pursue outpatient monitoring and therapy 


within a week with their primary care physician.





Departure


Diagnosis:  


   Primary Impression:  


   Gastritis


   Gastritis type:  unspecified gastritis  Chronicity:  chronic  Gastritis 


   bleeding:  without bleeding  Qualified Codes:  K29.50 - Unspecified chronic 


   gastritis without bleeding


   Additional Impression:  


   Nausea


Condition:  Stable


Patient Instructions:  Nausea, Gastritis (Adult)











RADHA DIAZ MD         May 20, 2019 01:37

## 2019-06-15 ENCOUNTER — HOSPITAL ENCOUNTER (EMERGENCY)
Dept: HOSPITAL 10 - E/R | Age: 74
Discharge: HOME | End: 2019-06-15
Payer: MEDICARE

## 2019-06-15 ENCOUNTER — HOSPITAL ENCOUNTER (EMERGENCY)
Dept: HOSPITAL 91 - E/R | Age: 74
Discharge: HOME | End: 2019-06-15
Payer: MEDICARE

## 2019-06-15 VITALS — HEART RATE: 78 BPM | RESPIRATION RATE: 20 BRPM | DIASTOLIC BLOOD PRESSURE: 74 MMHG | SYSTOLIC BLOOD PRESSURE: 149 MMHG

## 2019-06-15 VITALS
HEIGHT: 62 IN | BODY MASS INDEX: 28.58 KG/M2 | WEIGHT: 155.32 LBS | BODY MASS INDEX: 28.58 KG/M2 | HEIGHT: 62 IN | WEIGHT: 155.32 LBS

## 2019-06-15 DIAGNOSIS — I10: Primary | ICD-10-CM

## 2019-06-15 DIAGNOSIS — R11.2: ICD-10-CM

## 2019-06-15 DIAGNOSIS — E03.9: ICD-10-CM

## 2019-06-15 DIAGNOSIS — Z79.84: ICD-10-CM

## 2019-06-15 DIAGNOSIS — F41.0: ICD-10-CM

## 2019-06-15 LAB
ADD MAN DIFF?: NO
ALANINE AMINOTRANSFERASE: 12 IU/L (ref 13–69)
ALBUMIN/GLOBULIN RATIO: 1.46
ALBUMIN: 4.4 G/DL (ref 3.3–4.9)
ALKALINE PHOSPHATASE: 71 IU/L (ref 42–121)
ANION GAP: 11 (ref 5–13)
ASPARTATE AMINO TRANSFERASE: 21 IU/L (ref 15–46)
BASOPHIL #: 0 10^3/UL (ref 0–0.1)
BASOPHILS %: 0.2 % (ref 0–2)
BILIRUBIN,DIRECT: 0 MG/DL (ref 0–0.2)
BILIRUBIN,TOTAL: 0.6 MG/DL (ref 0.2–1.3)
BLOOD UREA NITROGEN: 8 MG/DL (ref 7–20)
CALCIUM: 9.8 MG/DL (ref 8.4–10.2)
CARBON DIOXIDE: 23 MMOL/L (ref 21–31)
CHLORIDE: 108 MMOL/L (ref 97–110)
CREATININE: 0.71 MG/DL (ref 0.44–1)
EOSINOPHILS #: 0 10^3/UL (ref 0–0.5)
EOSINOPHILS %: 0.4 % (ref 0–7)
GLOBULIN: 3 G/DL (ref 1.3–3.2)
GLUCOSE: 132 MG/DL (ref 70–220)
HEMATOCRIT: 39.4 % (ref 37–47)
HEMOGLOBIN: 12.6 G/DL (ref 12–16)
IMMATURE GRANS #M: 0.06 10^3/UL (ref 0–0.03)
IMMATURE GRANS % (M): 0.6 % (ref 0–0.43)
LIPASE: 66 U/L (ref 23–300)
LYMPHOCYTES #: 2.3 10^3/UL (ref 0.8–2.9)
LYMPHOCYTES %: 22 % (ref 15–51)
MEAN CORPUSCULAR HEMOGLOBIN: 28.1 PG (ref 29–33)
MEAN CORPUSCULAR HGB CONC: 32 G/DL (ref 32–37)
MEAN CORPUSCULAR VOLUME: 87.9 FL (ref 82–101)
MEAN PLATELET VOLUME: 10.1 FL (ref 7.4–10.4)
MONOCYTE #: 0.4 10^3/UL (ref 0.3–0.9)
MONOCYTES %: 3.9 % (ref 0–11)
NEUTROPHIL #: 7.5 10^3/UL (ref 1.6–7.5)
NEUTROPHILS %: 72.9 % (ref 39–77)
NUCLEATED RED BLOOD CELLS #: 0 10^3/UL (ref 0–0)
NUCLEATED RED BLOOD CELLS%: 0 /100WBC (ref 0–0)
PLATELET COUNT: 391 10^3/UL (ref 140–415)
POTASSIUM: 3.8 MMOL/L (ref 3.5–5.1)
RED BLOOD COUNT: 4.48 10^6/UL (ref 4.2–5.4)
RED CELL DISTRIBUTION WIDTH: 13.8 % (ref 11.5–14.5)
SODIUM: 142 MMOL/L (ref 135–144)
TOTAL PROTEIN: 7.4 G/DL (ref 6.1–8.1)
TROPONIN-I: 0.02 NG/ML (ref 0–0.12)
WHITE BLOOD COUNT: 10.3 10^3/UL (ref 4.8–10.8)

## 2019-06-15 PROCEDURE — 80053 COMPREHEN METABOLIC PANEL: CPT

## 2019-06-15 PROCEDURE — 96375 TX/PRO/DX INJ NEW DRUG ADDON: CPT

## 2019-06-15 PROCEDURE — 85025 COMPLETE CBC W/AUTO DIFF WBC: CPT

## 2019-06-15 PROCEDURE — 36415 COLL VENOUS BLD VENIPUNCTURE: CPT

## 2019-06-15 PROCEDURE — 96374 THER/PROPH/DIAG INJ IV PUSH: CPT

## 2019-06-15 PROCEDURE — 71045 X-RAY EXAM CHEST 1 VIEW: CPT

## 2019-06-15 PROCEDURE — 99285 EMERGENCY DEPT VISIT HI MDM: CPT

## 2019-06-15 PROCEDURE — 83690 ASSAY OF LIPASE: CPT

## 2019-06-15 PROCEDURE — 93005 ELECTROCARDIOGRAM TRACING: CPT

## 2019-06-15 PROCEDURE — 84484 ASSAY OF TROPONIN QUANT: CPT

## 2019-06-15 RX ADMIN — LORAZEPAM 1 MG: 2 INJECTION, SOLUTION INTRAMUSCULAR; INTRAVENOUS at 13:46

## 2019-06-15 RX ADMIN — KETOROLAC TROMETHAMINE 1 MG: 30 INJECTION, SOLUTION INTRAMUSCULAR at 14:03

## 2019-06-15 RX ADMIN — LABETALOL HYDROCHLORIDE 1 MG: 5 INJECTION, SOLUTION INTRAVENOUS at 14:10

## 2019-06-15 RX ADMIN — ONDANSETRON HYDROCHLORIDE 1 MG: 2 INJECTION, SOLUTION INTRAMUSCULAR; INTRAVENOUS at 13:46

## 2019-06-15 NOTE — ERD
ER Documentation


Chief Complaint


Chief Complaint





CODE GREEN: HYPERTENSION, ANXIETY WHILE VISITING ADMITTED MOTHER





HPI


Patient is a 73-year-old female with hypertension who presents with high blood 


pressure and vomiting.  A code green was called.  The patient started with the 


symptoms 3 to 4 months ago.  She is taking blood pressure medicines.  Her 


 was admitted today and is very sick.  The patient complained of 


right-sided chest pain as well.  Upon review of old medical records this is the 


patient's ninth visit to the ER since 2010.  Review of the emergency department 


information exchange system shows visits to 4 separate emergency departments for


a total of 8 visits over the past 1 year.  She does have a primary doctor.





ROS


All systems reviewed and are negative except as per history of present illness.





Medications


Home Meds


Active Scripts


Ondansetron (Ondansetron Odt) 4 Mg Tab.rapdis, 4 MG PO Q6H PRN for NAUSEA AND/OR


VOMITING, #10 TAB


   Prov:MELI BAUMANN MD         6/15/19


Reported Medications


Linaclotide (LINZESS) 145 Mcg Capsule, 145 MCG PO DAILY, #30 CAP


   6/15/19


Rosuvastatin Calcium* (Crestor*) 20 Mg Tablet, 20 MG PO QHS, #30 TAB


   6/15/19


Tramadol Hcl* (Ultram*) 50 Mg Tablet, 50 MG PO AS NEEDED PRN for PAIN, TAB


   6/15/19


Ergocalciferol (Vitamin D2) (VITAMIN D2) 50,000 Unit Capsule, 73767 UNIT PO Q 


SAT, CAP


   6/15/19


Verapamil Hcl* (Verapamil ER*) 120 Mg Tablet.er, 120 MG PO DAILY, TAB.SA


   6/15/19


Metformin Hcl* (Metformin Hcl*) 500 Mg Tablet, 500 MG PO AS NEEDED, #30 TAB


   6/15/19


Acetaminophen with Codeine (Acetaminophen-Cod #3 Tablet) 1 Each Tablet, 1 TAB PO


AS NEEDED, #7 TAB


   6/15/19


Meclizine Hcl* (Meclizine Hcl*) 25 Mg Tablet, 25 MG PO AS NEEDED PRN for 


DIZZINESS, TAB


   6/15/19


Levothyroxine Sodium* (Levothyroxine Sodium*) 25 Mcg Tablet, 25 MCG PO BEFORE 


BREAKFAST, #30 TAB


   6/15/19


Zolpidem Tartrate* (Zolpidem Tartrate*) 5 Mg Tablet, 5 MG PO QHS PRN for 


INSOMNIA, #30 TAB


   6/15/19


Discontinued Reported Medications


Acetaminophen* (Acetaminophen*) 500 MG Extra Strength Tablet, 500 MG PO Q4H PRN 


for PAIN AND OR ELEVATED TEMP, TAB


   5/8/19


Nitroglycerin* (Nitrostat*) 0.4 Mg Tab.subl, 0.4 MG SL Q5MIN PRN for CHEST PAIN,


BOTTLE


   5/8/19


Hydralazine Hcl* (Hydralazine Hcl*) 100 Mg Tablet, 1 TAB ORAL TID


   5/8/19


Diltiazem Hcl (DILTIAZEM 24HR CD) 180 Mg Cap.er.24h, 1 CAP ORAL DAILY


   5/8/19


Metoprolol Tartrate (Metoprolol Tartrate) 100 Mg Tablet, 1 TAB ORAL BID


   5/8/19


Aspirin (Aspirin) 81 Mg Chew, 1 TAB ORAL DAILY


   5/8/19


Triamcinolone Acetonide* (Kenalog*) 0.1%-15GM Cr, 1 APPLIC TOP TID, #1 TUB


   2/22/18


Donepezil* (Donepezil*) 10 Mg Tablet, 10 MG PO DAILY, #30 TAB


   2/22/18


Linaclotide (LINZESS) 145 Mcg Capsule, 145 MCG PO DAILY, #30 CAP


   2/22/18


Rosuvastatin Calcium* (Crestor*) 20 Mg Tablet, 20 MG PO QHS, #30 TAB


   2/22/18


Esomeprazole Mag Trihydrate (Nexium) 40 Mg Capsule.dr, 40 MG PO DAILY, #30 CAP


   2/22/18


Mirtazapine* (Mirtazapine*) 30 Mg Tablet, 30 MG PO DAILY, TAB


   2/22/18


Losartan-Hydrochlorothiazide (Losartan-HCTZ) 100-25 Mg Tab, 1 TAB PO DAILY, TAB


   2/22/18


Zolpidem Tartrate* (Zolpidem Tartrate*) 5 Mg Tablet, 5 MG PO QHS PRN for 


INSOMNIA, #30 TAB


   2/22/18


Amlodipine Besylate* (Norvasc*) 5 Mg Tablet, 5 MG PO DAILY, TAB


   2/22/18


Verapamil Hcl* (Verapamil ER*) 120 Mg Cap24h.pel, 120 MG PO DAILY, CAP


   2/22/18


Clotrimazole-Betamethasone Diprop (Clotrimazole-Betamethasone Diprop) 15 Gm 


Cream.gm., 1 APPLIC TOP BID, TUB


   2/22/18


Clonidine Patch (CLONIDINE PATCH) 0.3 Mg/24 Hr Patch, 1 PATCH.WK TD Q7D, #4 


PATCH.WK


   ON TUESDAY 2/22/18


Levothyroxine Sodium* (Levothyroxine Sodium*) 25 Mcg Tablet, 25 MCG PO BEFORE 


BREAKFAST, #30 TAB


   2/22/18


Meclizine Hcl* (Meclizine Hcl*) 25 Mg Tablet, 25 MG PO BID PRN for DIZZINESS, 


TAB


   2/22/18


Clonidine Hcl* (Clonidine Hcl*) 0.3 Mg Tablet, 0.3 MG PO TID PRN for HTN, TAB


   2/22/18


Discontinued Scripts


Metoclopramide* (Reglan*) 10 Mg Tablet, 10 MG PO Q6 PRN for NAUSEA AND/OR 


VOMITING, #20 TAB


   Prov:RADHA DIAZ MD         5/19/19


Simethicone* (Mylicon*) 80 Mg Tab, 80 MG PO QID PRN for DISTENSION/GAS/BLOATING,


#120 TAB 1 Refill


   Prov:MAXIMINO SAMANIEGO S.         5/10/19


Pantoprazole* (Pantoprazole*) 40 Mg Tablet.dr, 40 MG PO BID@06,18, #60 2 Refills


   Prov:MAXIMINO SAMANIEGO S.         5/10/19


Ondansetron (Ondansetron Odt) 4 Mg Tab.rapdis, 4 MG PO Q6H PRN for NAUSEA AND/OR


VOMITING, #10 TAB


   Prov:ANNA RAYMUNDO MD         8/8/18





Allergies


Allergies:  


Coded Allergies:  


     No Known Allergies (Verified  Allergy, Unknown, 6/15/19)





PMhx/Soc


History of Surgery:  No


Anesthesia Reaction:  No


Hx Neurological Disorder:  No


Hx Respiratory Disorders:  No


Hx Cardiac Disorders:  Yes (htn, afib)


Hx Psychiatric Problems:  No


Hx Miscellaneous Medical Probl:  Yes (HTN , hypothyroidism, gastritis)


Hx Alcohol Use:  No


Hx Substance Use:  No


Hx Tobacco Use:  No


Smoking Status:  Never smoker





FmHx


Family History:  No diabetes





Physical Exam


Vitals





Vital Signs


  Date      Temp  Pulse  Resp  B/P (MAP)   Pulse Ox  O2          O2 Flow    FiO2


Time                                                 Delivery    Rate


   6/15/19  98.4     78    20      149/74        99  Room Air


     15:39                           (99)


   6/15/19  98.4     62    20      133/75        98  Room Air


     14:40                           (94)


   6/15/19  98.0     66    28      158/81        98  Room Air


     14:05                          (106)


   6/15/19                                           Nasal               2


     14:00                                           Cannula


   6/15/19  98.0     71    30      174/83        98


     13:45                          (113)





Physical Exam


Const:   Mild distress


Head:   Atraumatic 


Eyes:    Normal Conjunctiva


ENT:    Normal External Ears, Nose and Mouth.


Neck:               Full range of motion. No meningismus.


Resp:   Clear to auscultation bilaterally


Cardio:   Regular rate and rhythm, no murmurs


Abd:    Soft, non tender, non distended. Normal bowel sounds


Skin:   No petechiae or rashes


Back:   No midline or flank tenderness


Ext:    No cyanosis, or edema


Neur:   Awake and alert


Psych:    Normal Mood and Affect


Result Diagram:  


6/15/19 1341                                                                    


           6/15/19 1341





Results 24 hrs





Laboratory Tests


              Test
                                 6/15/19
13:41


              White Blood Count                     10.3 10^3/ul


              Red Blood Count                       4.48 10^6/ul


              Hemoglobin                               12.6 g/dl


              Hematocrit                                  39.4 %


              Mean Corpuscular Volume                    87.9 fl


              Mean Corpuscular Hemoglobin                28.1 pg


              Mean Corpuscular Hemoglobin
Concent     32.0 g/dl 



              Red Cell Distribution Width                 13.8 %


              Platelet Count                         391 10^3/UL


              Mean Platelet Volume                       10.1 fl


              Immature Granulocytes %                    0.600 %


              Neutrophils %                               72.9 %


              Lymphocytes %                               22.0 %


              Monocytes %                                  3.9 %


              Eosinophils %                                0.4 %


              Basophils %                                  0.2 %


              Nucleated Red Blood Cells %            0.0 /100WBC


              Immature Granulocytes #              0.060 10^3/ul


              Neutrophils #                          7.5 10^3/ul


              Lymphocytes #                          2.3 10^3/ul


              Monocytes #                            0.4 10^3/ul


              Eosinophils #                          0.0 10^3/ul


              Basophils #                            0.0 10^3/ul


              Nucleated Red Blood Cells #            0.0 10^3/ul


              Sodium Level                            142 mmol/L


              Potassium Level                         3.8 mmol/L


              Chloride Level                          108 mmol/L


              Carbon Dioxide Level                     23 mmol/L


              Anion Gap                                       11


              Blood Urea Nitrogen                        8 mg/dl


              Creatinine                              0.71 mg/dl


              Est Glomerular Filtrat Rate
mL/min    mL/min 



              Glucose Level                            132 mg/dl


              Calcium Level                            9.8 mg/dl


              Total Bilirubin                          0.6 mg/dl


              Direct Bilirubin                        0.00 mg/dl


              Indirect Bilirubin                       0.6 mg/dl


              Aspartate Amino Transf
(AST/SGOT)         21 IU/L 



              Alanine Aminotransferase
(ALT/SGPT)       12 IU/L 



              Alkaline Phosphatase                       71 IU/L


              Troponin I                             0.016 ng/ml


              Total Protein                             7.4 g/dl


              Albumin                                   4.4 g/dl


              Globulin                                 3.00 g/dl


              Albumin/Globulin Ratio                        1.46


              Lipase                                      66 U/L





Current Medications


 Medications
   Dose
          Sig/Britni
       Start Time
   Status  Last


 (Trade)       Ordered        Route
 PRN     Stop Time              Admin
Dose


                              Reason                                Admin


 Ondansetron    4 mg           ONCE  STAT
    6/15/19       DC           6/15/19


HCl
  (Zofran                 IV
            13:33
                       13:46



Inj)                                         6/15/19 13:34


 Lorazepam
     1 mg           ONCE  ONCE
    6/15/19       DC           6/15/19


(Ativan)                      IV
            14:00
                       13:46



                                             6/15/19 14:01


 Labetalol      20 mg          ONCE  ONCE
    6/15/19       DC           6/15/19


HCl
                          IV
            14:00
                       14:10



(Labetalol)                                  6/15/19 14:01


 Ketorolac
     30 mg          ONCE  STAT
    6/15/19       DC           6/15/19


Tromethamine
                 IV
            13:58
                       14:03



 (Toradol)                                   6/15/19 13:59








Procedures/MDM


EKG read by me: 


Rate/Rhythm: Regular rate and rhythm at a normal rate


Intervals:    Normal


Impression:     No evidence of ischemia or arrhythmia





Chest x-ray read by radiology.





Patient is a 73-year-old female with hypertension who presents with hypertension


and vomiting.  She was treated with labetalol and her blood pressure is 


improved.  She was given Zofran and Ativan and her symptoms of vomiting are much


better.  I do believe there was an element of panic attack possibly brought on 


by her  who is very sick.  I doubt acute coronary syndrome, stroke, or 


serious letter to light abnormality.  I believe outpatient management is 


appropriate but the patient will need close follow-up with the primary doctor 


within 24 to 48 hours.  The patient can return sooner for any worsening 


symptoms.  The patient will be discharged with Zofran.  She should continue to 


take her blood pressure medicines as directed.





Departure


Diagnosis:  


   Primary Impression:  


   Panic


   Additional Impressions:  


   Hypertension


   Hypertension type:  essential hypertension  Qualified Codes:  I10 - Essential


   (primary) hypertension


   Vomiting


   Vomiting type:  unspecified  Vomiting Intractability:  non-intractable  


   Nausea presence:  with nausea  Qualified Codes:  R11.2 - Nausea with 


   vomiting, unspecified


Condition:  Fair


Patient Instructions:  High Blood Pressure (Hypertension), Anxiety Reaction, 


Vomiting (6Y-Adult)


Referrals:  


Your doctor





Additional Instructions:  


Call your primary care doctor TOMORROW for an appointment during the next 1-2 


days.See the doctor sooner or return here if your condition worsens before your 


appointment time.











MELI BAUMANN MD                [place] 15, 2019 15:55